# Patient Record
Sex: MALE | Race: OTHER | HISPANIC OR LATINO | URBAN - METROPOLITAN AREA
[De-identification: names, ages, dates, MRNs, and addresses within clinical notes are randomized per-mention and may not be internally consistent; named-entity substitution may affect disease eponyms.]

---

## 2019-12-04 ENCOUNTER — INPATIENT (INPATIENT)
Facility: HOSPITAL | Age: 24
LOS: 2 days | Discharge: HOME | DRG: 438 | End: 2019-12-07
Attending: INTERNAL MEDICINE | Admitting: INTERNAL MEDICINE
Payer: SELF-PAY

## 2019-12-04 VITALS
TEMPERATURE: 98 F | DIASTOLIC BLOOD PRESSURE: 97 MMHG | HEART RATE: 84 BPM | OXYGEN SATURATION: 98 % | SYSTOLIC BLOOD PRESSURE: 146 MMHG | RESPIRATION RATE: 20 BRPM

## 2019-12-04 DIAGNOSIS — E13.10 OTHER SPECIFIED DIABETES MELLITUS WITH KETOACIDOSIS WITHOUT COMA: ICD-10-CM

## 2019-12-04 LAB
ACETONE SERPL-MCNC: ABNORMAL
ALBUMIN SERPL ELPH-MCNC: 4.7 G/DL — SIGNIFICANT CHANGE UP (ref 3.3–5.2)
ALP SERPL-CCNC: 204 U/L — HIGH (ref 40–120)
ALT FLD-CCNC: 55 U/L — HIGH
AMPHET UR-MCNC: NEGATIVE — SIGNIFICANT CHANGE UP
AMYLASE P1 CFR SERPL: 265 U/L — HIGH (ref 36–128)
ANION GAP SERPL CALC-SCNC: 26 MMOL/L — HIGH (ref 5–17)
ANION GAP SERPL CALC-SCNC: 28 MMOL/L — HIGH (ref 5–17)
ANISOCYTOSIS BLD QL: SLIGHT — SIGNIFICANT CHANGE UP
APPEARANCE UR: CLEAR — SIGNIFICANT CHANGE UP
AST SERPL-CCNC: 55 U/L — HIGH
BACTERIA # UR AUTO: NEGATIVE — SIGNIFICANT CHANGE UP
BARBITURATES UR SCN-MCNC: NEGATIVE — SIGNIFICANT CHANGE UP
BASOPHILS # BLD AUTO: 0 K/UL — SIGNIFICANT CHANGE UP (ref 0–0.2)
BASOPHILS NFR BLD AUTO: 0 % — SIGNIFICANT CHANGE UP (ref 0–2)
BENZODIAZ UR-MCNC: NEGATIVE — SIGNIFICANT CHANGE UP
BILIRUB SERPL-MCNC: 0.3 MG/DL — LOW (ref 0.4–2)
BILIRUB UR-MCNC: NEGATIVE — SIGNIFICANT CHANGE UP
BUN SERPL-MCNC: 7 MG/DL — LOW (ref 8–20)
BUN SERPL-MCNC: 8 MG/DL — SIGNIFICANT CHANGE UP (ref 8–20)
CALCIUM SERPL-MCNC: 8.8 MG/DL — SIGNIFICANT CHANGE UP (ref 8.6–10.2)
CALCIUM SERPL-MCNC: 8.9 MG/DL — SIGNIFICANT CHANGE UP (ref 8.6–10.2)
CHLORIDE SERPL-SCNC: 101 MMOL/L — SIGNIFICANT CHANGE UP (ref 98–107)
CHLORIDE SERPL-SCNC: 105 MMOL/L — SIGNIFICANT CHANGE UP (ref 98–107)
CO2 SERPL-SCNC: 10 MMOL/L — CRITICAL LOW (ref 22–29)
CO2 SERPL-SCNC: 8 MMOL/L — CRITICAL LOW (ref 22–29)
COCAINE METAB.OTHER UR-MCNC: NEGATIVE — SIGNIFICANT CHANGE UP
COLOR SPEC: YELLOW — SIGNIFICANT CHANGE UP
CREAT SERPL-MCNC: 0.5 MG/DL — SIGNIFICANT CHANGE UP (ref 0.5–1.3)
CREAT SERPL-MCNC: 0.6 MG/DL — SIGNIFICANT CHANGE UP (ref 0.5–1.3)
DIFF PNL FLD: ABNORMAL
ELLIPTOCYTES BLD QL SMEAR: SLIGHT — SIGNIFICANT CHANGE UP
EOSINOPHIL # BLD AUTO: 0 K/UL — SIGNIFICANT CHANGE UP (ref 0–0.5)
EOSINOPHIL NFR BLD AUTO: 0 % — SIGNIFICANT CHANGE UP (ref 0–6)
EPI CELLS # UR: NEGATIVE — SIGNIFICANT CHANGE UP
GAS PNL BLDA: SIGNIFICANT CHANGE UP
GLUCOSE BLDC GLUCOMTR-MCNC: 236 MG/DL — HIGH (ref 70–99)
GLUCOSE BLDC GLUCOMTR-MCNC: 240 MG/DL — HIGH (ref 70–99)
GLUCOSE BLDC GLUCOMTR-MCNC: 241 MG/DL — HIGH (ref 70–99)
GLUCOSE BLDC GLUCOMTR-MCNC: 241 MG/DL — HIGH (ref 70–99)
GLUCOSE BLDC GLUCOMTR-MCNC: 258 MG/DL — HIGH (ref 70–99)
GLUCOSE BLDC GLUCOMTR-MCNC: 290 MG/DL — HIGH (ref 70–99)
GLUCOSE SERPL-MCNC: 245 MG/DL — HIGH (ref 70–115)
GLUCOSE SERPL-MCNC: 275 MG/DL — HIGH (ref 70–115)
GLUCOSE UR QL: 1000 MG/DL
HBA1C BLD-MCNC: 11.8 % — HIGH (ref 4–5.6)
HCT VFR BLD CALC: 47.2 % — SIGNIFICANT CHANGE UP (ref 39–50)
HGB BLD-MCNC: 16.7 G/DL — SIGNIFICANT CHANGE UP (ref 13–17)
KETONES UR-MCNC: ABNORMAL
LACTATE BLDV-MCNC: 1.3 MMOL/L — SIGNIFICANT CHANGE UP (ref 0.5–2)
LEUKOCYTE ESTERASE UR-ACNC: NEGATIVE — SIGNIFICANT CHANGE UP
LIDOCAIN IGE QN: 530 U/L — HIGH (ref 22–51)
LYMPHOCYTES # BLD AUTO: 1.53 K/UL — SIGNIFICANT CHANGE UP (ref 1–3.3)
LYMPHOCYTES # BLD AUTO: 16.5 % — SIGNIFICANT CHANGE UP (ref 13–44)
MACROCYTES BLD QL: SLIGHT — SIGNIFICANT CHANGE UP
MAGNESIUM SERPL-MCNC: 2.2 MG/DL — SIGNIFICANT CHANGE UP (ref 1.6–2.6)
MANUAL SMEAR VERIFICATION: SIGNIFICANT CHANGE UP
MCHC RBC-ENTMCNC: 32.5 PG — SIGNIFICANT CHANGE UP (ref 27–34)
MCHC RBC-ENTMCNC: 35.4 GM/DL — SIGNIFICANT CHANGE UP (ref 32–36)
MCV RBC AUTO: 91.8 FL — SIGNIFICANT CHANGE UP (ref 80–100)
METHADONE UR-MCNC: NEGATIVE — SIGNIFICANT CHANGE UP
MICROCYTES BLD QL: SLIGHT — SIGNIFICANT CHANGE UP
MONOCYTES # BLD AUTO: 0.08 K/UL — SIGNIFICANT CHANGE UP (ref 0–0.9)
MONOCYTES NFR BLD AUTO: 0.9 % — LOW (ref 2–14)
NEUTROPHILS # BLD AUTO: 7.67 K/UL — HIGH (ref 1.8–7.4)
NEUTROPHILS NFR BLD AUTO: 81.7 % — HIGH (ref 43–77)
NEUTS BAND # BLD: 0.9 % — SIGNIFICANT CHANGE UP (ref 0–8)
NITRITE UR-MCNC: NEGATIVE — SIGNIFICANT CHANGE UP
OPIATES UR-MCNC: POSITIVE
OSMOLALITY SERPL: 324 MOSMOL/KG — HIGH (ref 275–300)
OVALOCYTES BLD QL SMEAR: SLIGHT — SIGNIFICANT CHANGE UP
PCP SPEC-MCNC: SIGNIFICANT CHANGE UP
PCP UR-MCNC: NEGATIVE — SIGNIFICANT CHANGE UP
PH UR: 5 — SIGNIFICANT CHANGE UP (ref 5–8)
PHOSPHATE SERPL-MCNC: 2.4 MG/DL — SIGNIFICANT CHANGE UP (ref 2.4–4.7)
PLAT MORPH BLD: NORMAL — SIGNIFICANT CHANGE UP
PLATELET # BLD AUTO: 233 K/UL — SIGNIFICANT CHANGE UP (ref 150–400)
POIKILOCYTOSIS BLD QL AUTO: SLIGHT — SIGNIFICANT CHANGE UP
POLYCHROMASIA BLD QL SMEAR: SLIGHT — SIGNIFICANT CHANGE UP
POTASSIUM SERPL-MCNC: 4.3 MMOL/L — SIGNIFICANT CHANGE UP (ref 3.5–5.3)
POTASSIUM SERPL-MCNC: 5.2 MMOL/L — SIGNIFICANT CHANGE UP (ref 3.5–5.3)
POTASSIUM SERPL-SCNC: 4.3 MMOL/L — SIGNIFICANT CHANGE UP (ref 3.5–5.3)
POTASSIUM SERPL-SCNC: 5.2 MMOL/L — SIGNIFICANT CHANGE UP (ref 3.5–5.3)
PROT SERPL-MCNC: 9.1 G/DL — HIGH (ref 6.6–8.7)
PROT UR-MCNC: 30 MG/DL
RBC # BLD: 5.14 M/UL — SIGNIFICANT CHANGE UP (ref 4.2–5.8)
RBC # FLD: 15.4 % — HIGH (ref 10.3–14.5)
RBC BLD AUTO: ABNORMAL
RBC CASTS # UR COMP ASSIST: SIGNIFICANT CHANGE UP /HPF (ref 0–4)
SCHISTOCYTES BLD QL AUTO: SLIGHT — SIGNIFICANT CHANGE UP
SMUDGE CELLS # BLD: PRESENT — SIGNIFICANT CHANGE UP
SODIUM SERPL-SCNC: 137 MMOL/L — SIGNIFICANT CHANGE UP (ref 135–145)
SODIUM SERPL-SCNC: 141 MMOL/L — SIGNIFICANT CHANGE UP (ref 135–145)
SP GR SPEC: 1.02 — SIGNIFICANT CHANGE UP (ref 1.01–1.02)
THC UR QL: NEGATIVE — SIGNIFICANT CHANGE UP
TRIGL SERPL-MCNC: 1499 MG/DL — HIGH (ref 10–200)
TROPONIN T SERPL-MCNC: <0.01 NG/ML — SIGNIFICANT CHANGE UP (ref 0–0.06)
UROBILINOGEN FLD QL: NEGATIVE MG/DL — SIGNIFICANT CHANGE UP
WBC # BLD: 9.29 K/UL — SIGNIFICANT CHANGE UP (ref 3.8–10.5)
WBC # FLD AUTO: 9.29 K/UL — SIGNIFICANT CHANGE UP (ref 3.8–10.5)
WBC UR QL: SIGNIFICANT CHANGE UP

## 2019-12-04 PROCEDURE — 99291 CRITICAL CARE FIRST HOUR: CPT

## 2019-12-04 PROCEDURE — 74177 CT ABD & PELVIS W/CONTRAST: CPT | Mod: 26

## 2019-12-04 PROCEDURE — 71045 X-RAY EXAM CHEST 1 VIEW: CPT | Mod: 26

## 2019-12-04 PROCEDURE — 93010 ELECTROCARDIOGRAM REPORT: CPT

## 2019-12-04 PROCEDURE — 76705 ECHO EXAM OF ABDOMEN: CPT | Mod: 26

## 2019-12-04 RX ORDER — MORPHINE SULFATE 50 MG/1
4 CAPSULE, EXTENDED RELEASE ORAL ONCE
Refills: 0 | Status: DISCONTINUED | OUTPATIENT
Start: 2019-12-04 | End: 2019-12-04

## 2019-12-04 RX ORDER — SODIUM CHLORIDE 9 MG/ML
1000 INJECTION, SOLUTION INTRAVENOUS
Refills: 0 | Status: DISCONTINUED | OUTPATIENT
Start: 2019-12-04 | End: 2019-12-05

## 2019-12-04 RX ORDER — DEXTROSE 50 % IN WATER 50 %
25 SYRINGE (ML) INTRAVENOUS ONCE
Refills: 0 | Status: DISCONTINUED | OUTPATIENT
Start: 2019-12-04 | End: 2019-12-07

## 2019-12-04 RX ORDER — SODIUM CHLORIDE 9 MG/ML
1000 INJECTION INTRAMUSCULAR; INTRAVENOUS; SUBCUTANEOUS ONCE
Refills: 0 | Status: COMPLETED | OUTPATIENT
Start: 2019-12-04 | End: 2019-12-04

## 2019-12-04 RX ORDER — KETOROLAC TROMETHAMINE 30 MG/ML
15 SYRINGE (ML) INJECTION ONCE
Refills: 0 | Status: DISCONTINUED | OUTPATIENT
Start: 2019-12-04 | End: 2019-12-04

## 2019-12-04 RX ORDER — SODIUM CHLORIDE 9 MG/ML
2000 INJECTION, SOLUTION INTRAVENOUS ONCE
Refills: 0 | Status: COMPLETED | OUTPATIENT
Start: 2019-12-04 | End: 2019-12-04

## 2019-12-04 RX ORDER — INFLUENZA VIRUS VACCINE 15; 15; 15; 15 UG/.5ML; UG/.5ML; UG/.5ML; UG/.5ML
0.5 SUSPENSION INTRAMUSCULAR ONCE
Refills: 0 | Status: DISCONTINUED | OUTPATIENT
Start: 2019-12-04 | End: 2019-12-07

## 2019-12-04 RX ORDER — SODIUM BICARBONATE 1 MEQ/ML
50 SYRINGE (ML) INTRAVENOUS ONCE
Refills: 0 | Status: COMPLETED | OUTPATIENT
Start: 2019-12-04 | End: 2019-12-04

## 2019-12-04 RX ORDER — ONDANSETRON 8 MG/1
4 TABLET, FILM COATED ORAL ONCE
Refills: 0 | Status: COMPLETED | OUTPATIENT
Start: 2019-12-04 | End: 2019-12-04

## 2019-12-04 RX ORDER — FAMOTIDINE 10 MG/ML
20 INJECTION INTRAVENOUS ONCE
Refills: 0 | Status: COMPLETED | OUTPATIENT
Start: 2019-12-04 | End: 2019-12-04

## 2019-12-04 RX ORDER — SODIUM CHLORIDE 9 MG/ML
1000 INJECTION, SOLUTION INTRAVENOUS
Refills: 0 | Status: DISCONTINUED | OUTPATIENT
Start: 2019-12-04 | End: 2019-12-04

## 2019-12-04 RX ORDER — CHLORHEXIDINE GLUCONATE 213 G/1000ML
1 SOLUTION TOPICAL
Refills: 0 | Status: DISCONTINUED | OUTPATIENT
Start: 2019-12-04 | End: 2019-12-07

## 2019-12-04 RX ORDER — INSULIN HUMAN 100 [IU]/ML
3 INJECTION, SOLUTION SUBCUTANEOUS
Qty: 50 | Refills: 0 | Status: DISCONTINUED | OUTPATIENT
Start: 2019-12-04 | End: 2019-12-05

## 2019-12-04 RX ORDER — SODIUM BICARBONATE 1 MEQ/ML
0.24 SYRINGE (ML) INTRAVENOUS
Qty: 150 | Refills: 0 | Status: DISCONTINUED | OUTPATIENT
Start: 2019-12-04 | End: 2019-12-04

## 2019-12-04 RX ORDER — DEXTROSE 50 % IN WATER 50 %
12.5 SYRINGE (ML) INTRAVENOUS ONCE
Refills: 0 | Status: DISCONTINUED | OUTPATIENT
Start: 2019-12-04 | End: 2019-12-07

## 2019-12-04 RX ADMIN — SODIUM CHLORIDE 1000 MILLILITER(S): 9 INJECTION INTRAMUSCULAR; INTRAVENOUS; SUBCUTANEOUS at 17:23

## 2019-12-04 RX ADMIN — MORPHINE SULFATE 4 MILLIGRAM(S): 50 CAPSULE, EXTENDED RELEASE ORAL at 15:59

## 2019-12-04 RX ADMIN — Medication 150 MEQ/KG/HR: at 18:03

## 2019-12-04 RX ADMIN — SODIUM CHLORIDE 1000 MILLILITER(S): 9 INJECTION INTRAMUSCULAR; INTRAVENOUS; SUBCUTANEOUS at 16:00

## 2019-12-04 RX ADMIN — SODIUM CHLORIDE 1000 MILLILITER(S): 9 INJECTION INTRAMUSCULAR; INTRAVENOUS; SUBCUTANEOUS at 09:02

## 2019-12-04 RX ADMIN — Medication 50 MILLIEQUIVALENT(S): at 18:06

## 2019-12-04 RX ADMIN — Medication 15 MILLIGRAM(S): at 17:23

## 2019-12-04 RX ADMIN — SODIUM CHLORIDE 150 MILLILITER(S): 9 INJECTION, SOLUTION INTRAVENOUS at 20:10

## 2019-12-04 RX ADMIN — Medication 2 MILLIGRAM(S): at 21:04

## 2019-12-04 RX ADMIN — INSULIN HUMAN 3 UNIT(S)/HR: 100 INJECTION, SOLUTION SUBCUTANEOUS at 18:04

## 2019-12-04 RX ADMIN — Medication 15 MILLIGRAM(S): at 10:51

## 2019-12-04 RX ADMIN — FAMOTIDINE 20 MILLIGRAM(S): 10 INJECTION INTRAVENOUS at 09:02

## 2019-12-04 RX ADMIN — SODIUM CHLORIDE 1000 MILLILITER(S): 9 INJECTION INTRAMUSCULAR; INTRAVENOUS; SUBCUTANEOUS at 11:52

## 2019-12-04 RX ADMIN — ONDANSETRON 4 MILLIGRAM(S): 8 TABLET, FILM COATED ORAL at 09:02

## 2019-12-04 RX ADMIN — SODIUM CHLORIDE 2000 MILLILITER(S): 9 INJECTION, SOLUTION INTRAVENOUS at 22:59

## 2019-12-04 NOTE — H&P ADULT - ASSESSMENT
Acute pancreatitis  High AGMA    Neuro: No active issues  Cardiovascular: Aim for MAP target 65  Resp/Chest: Maintain SpO2 > 92%  GI/Nutrition: No gallstones on US. Will keep NPO for now  ID: No indication for Abx at this time. Will reassess needs  Renal: Start bicarb gtt @ 150cc/hr, BMP q8h. Check ASA, acetaminophen, SOsm and alcohol levels. U toxicology as well. Avoid nephrotoxic agents. Strict I&O  Endocrinology: Check A1c and lipid panel/ TGs. Maintain Blood sugar < 180. ISS as per protocol  Haem/Oncology:  DVT ppx w/ HSQ    Critical Care time: 35 minutes assessing presenting problems of acute illness that poses high probability of life threatening deterioration or end organ damage/dysfunction.  Medical decision making including Initiating plan of care, reviewing data, reviewing radiology, discussing with multidisciplinary team, non inclusive of procedures

## 2019-12-04 NOTE — H&P ADULT - NSHPREVIEWOFSYSTEMS_GEN_ALL_CORE
CONSTITUTIONAL: No fever, chills, or fatigue  EYES: No eye pain, visual disturbances, or discharge  ENMT:  No difficulty hearing, tinnitus, vertigo; No sinus or throat pain  NECK: No pain or stiffness  RESPIRATORY: No cough, wheezing, chills or hemoptysis; No shortness of breath  CARDIOVASCULAR: No chest pain, palpitations, dizziness, or leg swelling  GASTROINTESTINAL: +ve abdominal or epigastric pain. No nausea, vomiting, or hematemesis; No diarrhea or constipation. No melena or hematochezia.  GENITOURINARY: No dysuria, frequency, hematuria, or incontinence  NEUROLOGICAL: No headaches, memory loss, loss of strength, numbness, or tremors  SKIN: No itching, burning, rashes, or lesions   MUSCULOSKELETAL: No joint pain or swelling; No muscle, back, or extremity pain  PSYCHIATRIC: No depression, anxiety, mood swings, or difficulty sleeping

## 2019-12-04 NOTE — ED PROVIDER NOTE - PROGRESS NOTE DETAILS
PT evaluated by intake physician. HPI/PE/ROS as noted above. Will follow up plan per intake physician. pt reevaluated, pt in a lot of pain, will order pain meds and reassesses ICU consult called, PA to see Pt.

## 2019-12-04 NOTE — ED PROVIDER NOTE - NS ED ROS FT
Constitutional: (-) fever  (-)chills  (-)sweats  Eyes/ENT: (-) blurry vision, (-) epistaxis  (-)rhinorrhea   (-) sore throat    Cardiovascular: (-) chest pain, (-) palpitations (-) edema   Respiratory: (-) cough, (-) shortness of breath   Gastrointestinal: (-)nausea  (-)vomiting, (-) diarrhea  (+) epigastric abdominal pain   :  (-)dysuria, (-)frequency, (-)urgency, (-)hematuria  Musculoskeletal: (-) neck pain, (-) back pain, (-) joint pain  Integumentary: (-) rash, (-) edema  Neurological: (-) headache, (-) altered mental status  (-)LOC Constitutional: (-) fever  (-)chills  (-)sweats  Eyes/ENT: (-) blurry vision, (-) epistaxis  (-)rhinorrhea   (-) sore throat    Cardiovascular: (-) chest pain, (-) palpitations (-) edema   Respiratory: (-) cough, (-) shortness of breath   Gastrointestinal: (-)nausea  (-)vomiting, (-) diarrhea  (+) epigastric abdominal pain   :  (-)dysuria, (-)frequency, (-)urgency, (-)hematuria  Neurological: (-) headache,

## 2019-12-04 NOTE — H&P ADULT - HISTORY OF PRESENT ILLNESS
24M w/ no significant PMHx presented w/ abdominal pain of one day duration. He describe the pain was 10/10, non radiating and not associated w/ N/V/D/fever. No chest pain, LOC. Admits to binge drinking about 8-10 beers on Saturday. Denies any other substance abuse.   In ED he was HDS w/ elevated lipase and severe acidosis. CT abdo significant for acute pancreatitis 24M w/ no significant PMHx presented w/ abdominal pain of one day duration. He describes the pain as 10/10, non radiating and not associated w/ N/V/D/fever. No chest pain, LOC, trauma or falls. Admits to binge drinking about 8-10 beers on Saturday. Denies any other substance abuse.   In ED he was HDS w/ elevated lipase and severe acidosis. CT abdo significant for acute pancreatitis

## 2019-12-04 NOTE — ED PROVIDER NOTE - OBJECTIVE STATEMENT
23 y/o M pt with no PMHx presents to the ED c/o abdominal pain, beginning at 5am this morning. Patient was woken up from sleep from the pain. Pain is located in the epigastrium. Reports trying to make himself vomit to relieve the pain, with no success in vomiting, Denies nausea/vomiting/diarrhea. Reports having a turkey sandwich with [employer] yesterday. Drinking EtOH over the weekend. States he has not experienced this pain before.   SOC: social EtOH use  PMHx: n/a

## 2019-12-04 NOTE — H&P ADULT - NSHPPHYSICALEXAM_GEN_ALL_CORE
General: No acute distress.      HEENT: Pupils equal, reactive to light.  Symmetric.    PULM: Clear to auscultation bilaterally, no significant sputum production    NECK: Supple, no lymphadenopathy, trachea midline    CVS: Regular rate and rhythm, no murmurs, rubs, or gallops    ABD: Soft, nondistended, Epigastric tenderness, Sluggish bowel sounds    EXT: No edema, nontender    SKIN: Warm and well perfused, no rashes noted.    NEURO: Alert, oriented, interactive, nonfocal

## 2019-12-05 LAB
ANION GAP SERPL CALC-SCNC: 15 MMOL/L — SIGNIFICANT CHANGE UP (ref 5–17)
ANION GAP SERPL CALC-SCNC: 16 MMOL/L — SIGNIFICANT CHANGE UP (ref 5–17)
ANION GAP SERPL CALC-SCNC: 20 MMOL/L — HIGH (ref 5–17)
ANION GAP SERPL CALC-SCNC: 21 MMOL/L — HIGH (ref 5–17)
BUN SERPL-MCNC: 3 MG/DL — LOW (ref 8–20)
BUN SERPL-MCNC: <3 MG/DL — LOW (ref 8–20)
CALCIUM SERPL-MCNC: 8.5 MG/DL — LOW (ref 8.6–10.2)
CALCIUM SERPL-MCNC: 8.7 MG/DL — SIGNIFICANT CHANGE UP (ref 8.6–10.2)
CALCIUM SERPL-MCNC: 8.7 MG/DL — SIGNIFICANT CHANGE UP (ref 8.6–10.2)
CALCIUM SERPL-MCNC: 8.9 MG/DL — SIGNIFICANT CHANGE UP (ref 8.6–10.2)
CHLORIDE SERPL-SCNC: 106 MMOL/L — SIGNIFICANT CHANGE UP (ref 98–107)
CHLORIDE SERPL-SCNC: 107 MMOL/L — SIGNIFICANT CHANGE UP (ref 98–107)
CHLORIDE SERPL-SCNC: 109 MMOL/L — HIGH (ref 98–107)
CHLORIDE SERPL-SCNC: 111 MMOL/L — HIGH (ref 98–107)
CO2 SERPL-SCNC: 10 MMOL/L — CRITICAL LOW (ref 22–29)
CO2 SERPL-SCNC: 13 MMOL/L — LOW (ref 22–29)
CO2 SERPL-SCNC: 19 MMOL/L — LOW (ref 22–29)
CO2 SERPL-SCNC: 20 MMOL/L — LOW (ref 22–29)
CREAT SERPL-MCNC: 0.49 MG/DL — LOW (ref 0.5–1.3)
CREAT SERPL-MCNC: 0.54 MG/DL — SIGNIFICANT CHANGE UP (ref 0.5–1.3)
CREAT SERPL-MCNC: 0.54 MG/DL — SIGNIFICANT CHANGE UP (ref 0.5–1.3)
CREAT SERPL-MCNC: 0.56 MG/DL — SIGNIFICANT CHANGE UP (ref 0.5–1.3)
CULTURE RESULTS: SIGNIFICANT CHANGE UP
GLUCOSE BLDC GLUCOMTR-MCNC: 159 MG/DL — HIGH (ref 70–99)
GLUCOSE BLDC GLUCOMTR-MCNC: 169 MG/DL — HIGH (ref 70–99)
GLUCOSE BLDC GLUCOMTR-MCNC: 173 MG/DL — HIGH (ref 70–99)
GLUCOSE BLDC GLUCOMTR-MCNC: 176 MG/DL — HIGH (ref 70–99)
GLUCOSE BLDC GLUCOMTR-MCNC: 179 MG/DL — HIGH (ref 70–99)
GLUCOSE BLDC GLUCOMTR-MCNC: 180 MG/DL — HIGH (ref 70–99)
GLUCOSE BLDC GLUCOMTR-MCNC: 181 MG/DL — HIGH (ref 70–99)
GLUCOSE BLDC GLUCOMTR-MCNC: 183 MG/DL — HIGH (ref 70–99)
GLUCOSE BLDC GLUCOMTR-MCNC: 191 MG/DL — HIGH (ref 70–99)
GLUCOSE BLDC GLUCOMTR-MCNC: 194 MG/DL — HIGH (ref 70–99)
GLUCOSE BLDC GLUCOMTR-MCNC: 196 MG/DL — HIGH (ref 70–99)
GLUCOSE BLDC GLUCOMTR-MCNC: 197 MG/DL — HIGH (ref 70–99)
GLUCOSE BLDC GLUCOMTR-MCNC: 202 MG/DL — HIGH (ref 70–99)
GLUCOSE BLDC GLUCOMTR-MCNC: 202 MG/DL — HIGH (ref 70–99)
GLUCOSE BLDC GLUCOMTR-MCNC: 219 MG/DL — HIGH (ref 70–99)
GLUCOSE BLDC GLUCOMTR-MCNC: 231 MG/DL — HIGH (ref 70–99)
GLUCOSE BLDC GLUCOMTR-MCNC: 233 MG/DL — HIGH (ref 70–99)
GLUCOSE BLDC GLUCOMTR-MCNC: 272 MG/DL — HIGH (ref 70–99)
GLUCOSE SERPL-MCNC: 158 MG/DL — HIGH (ref 70–115)
GLUCOSE SERPL-MCNC: 179 MG/DL — HIGH (ref 70–115)
GLUCOSE SERPL-MCNC: 196 MG/DL — HIGH (ref 70–115)
GLUCOSE SERPL-MCNC: 214 MG/DL — HIGH (ref 70–115)
HCT VFR BLD CALC: 46.2 % — SIGNIFICANT CHANGE UP (ref 39–50)
HGB BLD-MCNC: 16 G/DL — SIGNIFICANT CHANGE UP (ref 13–17)
LIDOCAIN IGE QN: 474 U/L — HIGH (ref 22–51)
MAGNESIUM SERPL-MCNC: 1.9 MG/DL — SIGNIFICANT CHANGE UP (ref 1.6–2.6)
MAGNESIUM SERPL-MCNC: 2 MG/DL — SIGNIFICANT CHANGE UP (ref 1.6–2.6)
MAGNESIUM SERPL-MCNC: 2 MG/DL — SIGNIFICANT CHANGE UP (ref 1.8–2.6)
MAGNESIUM SERPL-MCNC: 2.1 MG/DL — SIGNIFICANT CHANGE UP (ref 1.6–2.6)
MCHC RBC-ENTMCNC: 31.3 PG — SIGNIFICANT CHANGE UP (ref 27–34)
MCHC RBC-ENTMCNC: 34.6 GM/DL — SIGNIFICANT CHANGE UP (ref 32–36)
MCV RBC AUTO: 90.2 FL — SIGNIFICANT CHANGE UP (ref 80–100)
PHOSPHATE SERPL-MCNC: 1.7 MG/DL — LOW (ref 2.4–4.7)
PHOSPHATE SERPL-MCNC: 1.8 MG/DL — LOW (ref 2.4–4.7)
PHOSPHATE SERPL-MCNC: 2.5 MG/DL — SIGNIFICANT CHANGE UP (ref 2.4–4.7)
PLATELET # BLD AUTO: 173 K/UL — SIGNIFICANT CHANGE UP (ref 150–400)
POTASSIUM SERPL-MCNC: 3.2 MMOL/L — LOW (ref 3.5–5.3)
POTASSIUM SERPL-MCNC: 3.6 MMOL/L — SIGNIFICANT CHANGE UP (ref 3.5–5.3)
POTASSIUM SERPL-MCNC: 3.6 MMOL/L — SIGNIFICANT CHANGE UP (ref 3.5–5.3)
POTASSIUM SERPL-MCNC: 4 MMOL/L — SIGNIFICANT CHANGE UP (ref 3.5–5.3)
POTASSIUM SERPL-SCNC: 3.2 MMOL/L — LOW (ref 3.5–5.3)
POTASSIUM SERPL-SCNC: 3.6 MMOL/L — SIGNIFICANT CHANGE UP (ref 3.5–5.3)
POTASSIUM SERPL-SCNC: 3.6 MMOL/L — SIGNIFICANT CHANGE UP (ref 3.5–5.3)
POTASSIUM SERPL-SCNC: 4 MMOL/L — SIGNIFICANT CHANGE UP (ref 3.5–5.3)
RBC # BLD: 5.12 M/UL — SIGNIFICANT CHANGE UP (ref 4.2–5.8)
RBC # FLD: 16.3 % — HIGH (ref 10.3–14.5)
SODIUM SERPL-SCNC: 141 MMOL/L — SIGNIFICANT CHANGE UP (ref 135–145)
SODIUM SERPL-SCNC: 142 MMOL/L — SIGNIFICANT CHANGE UP (ref 135–145)
SPECIMEN SOURCE: SIGNIFICANT CHANGE UP
TRIGL SERPL-MCNC: 482 MG/DL — HIGH (ref 10–200)
WBC # BLD: 7.67 K/UL — SIGNIFICANT CHANGE UP (ref 3.8–10.5)
WBC # FLD AUTO: 7.67 K/UL — SIGNIFICANT CHANGE UP (ref 3.8–10.5)

## 2019-12-05 PROCEDURE — 99222 1ST HOSP IP/OBS MODERATE 55: CPT

## 2019-12-05 PROCEDURE — 99291 CRITICAL CARE FIRST HOUR: CPT

## 2019-12-05 RX ORDER — POTASSIUM CHLORIDE 20 MEQ
40 PACKET (EA) ORAL EVERY 4 HOURS
Refills: 0 | Status: COMPLETED | OUTPATIENT
Start: 2019-12-05 | End: 2019-12-05

## 2019-12-05 RX ORDER — SODIUM CHLORIDE 9 MG/ML
2000 INJECTION, SOLUTION INTRAVENOUS ONCE
Refills: 0 | Status: COMPLETED | OUTPATIENT
Start: 2019-12-05 | End: 2019-12-05

## 2019-12-05 RX ORDER — MORPHINE SULFATE 50 MG/1
2 CAPSULE, EXTENDED RELEASE ORAL EVERY 6 HOURS
Refills: 0 | Status: DISCONTINUED | OUTPATIENT
Start: 2019-12-05 | End: 2019-12-07

## 2019-12-05 RX ORDER — SODIUM CHLORIDE 9 MG/ML
1000 INJECTION, SOLUTION INTRAVENOUS
Refills: 0 | Status: DISCONTINUED | OUTPATIENT
Start: 2019-12-05 | End: 2019-12-06

## 2019-12-05 RX ORDER — INSULIN GLARGINE 100 [IU]/ML
15 INJECTION, SOLUTION SUBCUTANEOUS AT BEDTIME
Refills: 0 | Status: DISCONTINUED | OUTPATIENT
Start: 2019-12-05 | End: 2019-12-07

## 2019-12-05 RX ORDER — THIAMINE MONONITRATE (VIT B1) 100 MG
100 TABLET ORAL DAILY
Refills: 0 | Status: DISCONTINUED | OUTPATIENT
Start: 2019-12-05 | End: 2019-12-07

## 2019-12-05 RX ORDER — INSULIN GLARGINE 100 [IU]/ML
20 INJECTION, SOLUTION SUBCUTANEOUS ONCE
Refills: 0 | Status: COMPLETED | OUTPATIENT
Start: 2019-12-05 | End: 2019-12-05

## 2019-12-05 RX ORDER — POTASSIUM PHOSPHATE, MONOBASIC POTASSIUM PHOSPHATE, DIBASIC 236; 224 MG/ML; MG/ML
30 INJECTION, SOLUTION INTRAVENOUS ONCE
Refills: 0 | Status: COMPLETED | OUTPATIENT
Start: 2019-12-05 | End: 2019-12-05

## 2019-12-05 RX ORDER — FENOFIBRATE,MICRONIZED 130 MG
145 CAPSULE ORAL DAILY
Refills: 0 | Status: DISCONTINUED | OUTPATIENT
Start: 2019-12-05 | End: 2019-12-07

## 2019-12-05 RX ORDER — ENOXAPARIN SODIUM 100 MG/ML
40 INJECTION SUBCUTANEOUS DAILY
Refills: 0 | Status: DISCONTINUED | OUTPATIENT
Start: 2019-12-05 | End: 2019-12-07

## 2019-12-05 RX ORDER — ACETAMINOPHEN 500 MG
650 TABLET ORAL ONCE
Refills: 0 | Status: COMPLETED | OUTPATIENT
Start: 2019-12-05 | End: 2019-12-05

## 2019-12-05 RX ORDER — MAGNESIUM SULFATE 500 MG/ML
2 VIAL (ML) INJECTION ONCE
Refills: 0 | Status: COMPLETED | OUTPATIENT
Start: 2019-12-05 | End: 2019-12-05

## 2019-12-05 RX ORDER — INSULIN LISPRO 100/ML
VIAL (ML) SUBCUTANEOUS
Refills: 0 | Status: DISCONTINUED | OUTPATIENT
Start: 2019-12-05 | End: 2019-12-07

## 2019-12-05 RX ADMIN — INSULIN GLARGINE 15 UNIT(S): 100 INJECTION, SOLUTION SUBCUTANEOUS at 22:03

## 2019-12-05 RX ADMIN — MORPHINE SULFATE 2 MILLIGRAM(S): 50 CAPSULE, EXTENDED RELEASE ORAL at 21:00

## 2019-12-05 RX ADMIN — POTASSIUM PHOSPHATE, MONOBASIC POTASSIUM PHOSPHATE, DIBASIC 83.33 MILLIMOLE(S): 236; 224 INJECTION, SOLUTION INTRAVENOUS at 13:03

## 2019-12-05 RX ADMIN — Medication 650 MILLIGRAM(S): at 03:58

## 2019-12-05 RX ADMIN — Medication 1 TABLET(S): at 22:03

## 2019-12-05 RX ADMIN — Medication 100 MILLIGRAM(S): at 22:03

## 2019-12-05 RX ADMIN — Medication 40 MILLIEQUIVALENT(S): at 23:33

## 2019-12-05 RX ADMIN — SODIUM CHLORIDE 4000 MILLILITER(S): 9 INJECTION, SOLUTION INTRAVENOUS at 04:50

## 2019-12-05 RX ADMIN — Medication 2: at 22:04

## 2019-12-05 RX ADMIN — ENOXAPARIN SODIUM 40 MILLIGRAM(S): 100 INJECTION SUBCUTANEOUS at 13:03

## 2019-12-05 RX ADMIN — Medication 145 MILLIGRAM(S): at 22:03

## 2019-12-05 RX ADMIN — Medication 40 MILLIEQUIVALENT(S): at 20:29

## 2019-12-05 RX ADMIN — Medication 650 MILLIGRAM(S): at 02:58

## 2019-12-05 RX ADMIN — INSULIN GLARGINE 20 UNIT(S): 100 INJECTION, SOLUTION SUBCUTANEOUS at 16:20

## 2019-12-05 RX ADMIN — Medication 50 GRAM(S): at 20:08

## 2019-12-05 RX ADMIN — MORPHINE SULFATE 2 MILLIGRAM(S): 50 CAPSULE, EXTENDED RELEASE ORAL at 20:29

## 2019-12-05 RX ADMIN — POTASSIUM PHOSPHATE, MONOBASIC POTASSIUM PHOSPHATE, DIBASIC 83.33 MILLIMOLE(S): 236; 224 INJECTION, SOLUTION INTRAVENOUS at 05:59

## 2019-12-05 NOTE — PROVIDER CONTACT NOTE (EICU) - SITUATION
Called by RN for fingerstick of 194 on insulin infusion w/ 6units/hr. Instructed RN to continue w/ D5+ LR @ 150ml/hr. BMP, Mg, Phos to be repeated to trend AG. RN also reports fever of 100.3F as such ordered blood cultures and tylenol PO.

## 2019-12-05 NOTE — CONSULT NOTE ADULT - ASSESSMENT
DM type 2, with acute loss of control due to acute pancreatitis, probably precipitated by alcohol. A1C elevation indicates onset of diabetes prior to this hospitalization. Severe acanthosis and obesity suggests underlying type 2  diabetes.  Reasonable to treat with insulin for now, but suspect that he will eventually be treatable with oral agents. Cannot use a glp-1 due to pancreatitis.  Will follow.

## 2019-12-05 NOTE — PROGRESS NOTE ADULT - SUBJECTIVE AND OBJECTIVE BOX
25 y/o male with no PMH who came to the ED for abdominal pain admitted to MICU for acute pancreatitis and DKA.     HPI:  24M w/ no significant PMHx presented w/ abdominal pain of one day duration. He describes the pain as 10/10, non radiating and not associated w/ N/V/D/fever. No chest pain, LOC, trauma or falls. Admits to binge drinking about 8-10 beers on Saturday. Denies any other substance abuse.   In ED he was HDS w/ elevated lipase and severe acidosis. CT abdo significant for acute pancreatitis (04 Dec 2019 17:52)    Today, patient seen and examined at bed side, not in acute distress. Still complaining of epigastric pain 4/10 but noted significant improvement. Tolerating PO well, no nausea/vomiting.       PAST MEDICAL & SURGICAL HISTORY:  No pertinent past medical history  No significant past surgical history      MEDICATIONS  (STANDING):  chlorhexidine 4% Liquid 1 Application(s) Topical <User Schedule>  dextrose 50% Injectable 25 Gram(s) IV Push once  dextrose 50% Injectable 12.5 Gram(s) IV Push once  enoxaparin Injectable 40 milliGRAM(s) SubCutaneous daily  fenofibrate Tablet 145 milliGRAM(s) Oral daily  influenza   Vaccine 0.5 milliLiter(s) IntraMuscular once  insulin glargine Injectable (LANTUS) 15 Unit(s) SubCutaneous at bedtime  insulin lispro (HumaLOG) corrective regimen sliding scale   SubCutaneous Before meals and at bedtime  multiple electrolytes Injection Type 1 1000 milliLiter(s) (150 mL/Hr) IV Continuous <Continuous>  multivitamin 1 Tablet(s) Oral daily  potassium chloride    Tablet ER 40 milliEquivalent(s) Oral every 4 hours  thiamine 100 milliGRAM(s) Oral daily    MEDICATIONS  (PRN):  morphine  - Injectable 2 milliGRAM(s) IV Push every 6 hours PRN Severe Pain (7 - 10)    Allergies    No Known Allergies      SOCIAL HISTORY: no cigarette or illicit drug use. Drinks beer socially; last drink Saturday (8 cans of beer)     FAMILY HISTORY:  No pertinent family history in first degree relatives      Vital Signs Last 24 Hrs  T(C): 37.6 (05 Dec 2019 20:00), Max: 38 (05 Dec 2019 16:00)  T(F): 99.7 (05 Dec 2019 20:00), Max: 100.4 (05 Dec 2019 16:00)  HR: 114 (05 Dec 2019 21:00) (113 - 137)  BP: 148/85 (05 Dec 2019 21:00) (128/78 - 171/88)  BP(mean): 110 (05 Dec 2019 21:00) (96 - 122)  RR: 40 (05 Dec 2019 21:00) (20 - 44)  SpO2: 97% (05 Dec 2019 21:00) (95% - 99%)    PHYSICAL EXAM:      Constitutional: obesity, well groomed, not in acute distress     Eyes: PERRLA     ENMT: mouth moist     Respiratory: CTA b/l     Cardiovascular: s1, s2 RRR    Gastrointestinal: epigastric tenderness otherwise soft, BS+    Extremities: no     Vascular:    Neurological:    Skin:    Lymph Nodes:    Musculoskeletal:    Psychiatric:        LABS:                        16.0   7.67  )-----------( 173      ( 05 Dec 2019 06:50 )             46.2     12-    142  |  106  |  <3.0<L>  ----------------------------<  196<H>  3.2<L>   |  20.0<L>  |  0.49<L>    Ca    8.9      05 Dec 2019 18:08  Phos  2.5     12-05  Mg     1.9     12-05    TPro  9.1<H>  /  Alb  4.7  /  TBili  0.3<L>  /  DBili  x   /  AST  55<H>  /  ALT  55<H>  /  AlkPhos  204<H>  12-04      Urinalysis Basic - ( 04 Dec 2019 13:38 )    Color: Yellow / Appearance: Clear / S.025 / pH: x  Gluc: x / Ketone: Large  / Bili: Negative / Urobili: Negative mg/dL   Blood: x / Protein: 30 mg/dL / Nitrite: Negative   Leuk Esterase: Negative / RBC: 0-2 /HPF / WBC 0-2   Sq Epi: x / Non Sq Epi: Negative / Bacteria: Negative        RADIOLOGY & ADDITIONAL STUDIES: 25 y/o male with no PMH who came to the ED for abdominal pain admitted to MICU for acute pancreatitis and DKA.     HPI:  24M w/ no significant PMHx presented w/ abdominal pain of one day duration. He describes the pain as 10/10, non radiating and not associated w/ N/V/D/fever. No chest pain, LOC, trauma or falls. Admits to binge drinking about 8-10 beers on Saturday. Denies any other substance abuse.   In ED he was HDS w/ elevated lipase and severe acidosis. CT abdo significant for acute pancreatitis (04 Dec 2019 17:52)    Today, patient seen and examined at bed side, not in acute distress. Still complaining of epigastric pain 4/10 but noted significant improvement. Tolerating PO well, no nausea/vomiting.       PAST MEDICAL & SURGICAL HISTORY:  No pertinent past medical history  No significant past surgical history      MEDICATIONS  (STANDING):  chlorhexidine 4% Liquid 1 Application(s) Topical <User Schedule>  dextrose 50% Injectable 25 Gram(s) IV Push once  dextrose 50% Injectable 12.5 Gram(s) IV Push once  enoxaparin Injectable 40 milliGRAM(s) SubCutaneous daily  fenofibrate Tablet 145 milliGRAM(s) Oral daily  influenza   Vaccine 0.5 milliLiter(s) IntraMuscular once  insulin glargine Injectable (LANTUS) 15 Unit(s) SubCutaneous at bedtime  insulin lispro (HumaLOG) corrective regimen sliding scale   SubCutaneous Before meals and at bedtime  multiple electrolytes Injection Type 1 1000 milliLiter(s) (150 mL/Hr) IV Continuous <Continuous>  multivitamin 1 Tablet(s) Oral daily  potassium chloride    Tablet ER 40 milliEquivalent(s) Oral every 4 hours  thiamine 100 milliGRAM(s) Oral daily    MEDICATIONS  (PRN):  morphine  - Injectable 2 milliGRAM(s) IV Push every 6 hours PRN Severe Pain (7 - 10)    Allergies    No Known Allergies      SOCIAL HISTORY: no cigarette or illicit drug use. Drinks beer socially; last drink Saturday (8 cans of beer)     FAMILY HISTORY:  No pertinent family history in first degree relatives      Vital Signs Last 24 Hrs  T(C): 37.6 (05 Dec 2019 20:00), Max: 38 (05 Dec 2019 16:00)  T(F): 99.7 (05 Dec 2019 20:00), Max: 100.4 (05 Dec 2019 16:00)  HR: 114 (05 Dec 2019 21:00) (113 - 137)  BP: 148/85 (05 Dec 2019 21:00) (128/78 - 171/88)  BP(mean): 110 (05 Dec 2019 21:00) (96 - 122)  RR: 40 (05 Dec 2019 21:00) (20 - 44)  SpO2: 97% (05 Dec 2019 21:00) (95% - 99%)    PHYSICAL EXAM:      Constitutional: obesity, well groomed, not in acute distress     Eyes: PERRLA     ENMT: mouth moist     Respiratory: CTA b/l     Cardiovascular: s1, s2 RRR    Gastrointestinal: epigastric tenderness otherwise soft, BS+    Extremities: no edema     Neurological: awake, alert and oriented. Follows command     Skin: warm, normal color     Musculoskeletal: ROM intact, no joint swelling, no calf tenderness     Psychiatric: normal mood and affect       LABS:                        16.0   7.67  )-----------( 173      ( 05 Dec 2019 06:50 )             46.2     12-    142  |  106  |  <3.0<L>  ----------------------------<  196<H>  3.2<L>   |  20.0<L>  |  0.49<L>    Ca    8.9      05 Dec 2019 18:08  Phos  2.5     12-05  Mg     1.9     12-05    TPro  9.1<H>  /  Alb  4.7  /  TBili  0.3<L>  /  DBili  x   /  AST  55<H>  /  ALT  55<H>  /  AlkPhos  204<H>  12-04      Urinalysis Basic - ( 04 Dec 2019 13:38 )    Color: Yellow / Appearance: Clear / S.025 / pH: x  Gluc: x / Ketone: Large  / Bili: Negative / Urobili: Negative mg/dL   Blood: x / Protein: 30 mg/dL / Nitrite: Negative   Leuk Esterase: Negative / RBC: 0-2 /HPF / WBC 0-2   Sq Epi: x / Non Sq Epi: Negative / Bacteria: Negative

## 2019-12-05 NOTE — PROGRESS NOTE ADULT - SUBJECTIVE AND OBJECTIVE BOX
Patient is a 24y old  Male who presents with a chief complaint of Abdominal pain (04 Dec 2019 17:52)      BRIEF HOSPITAL COURSE:   24M w/ no significant PMHx presented w/ abdominal pain of one day duration. He describes the pain as 10/10, non radiating and not associated w/ N/V/D/fever. No chest pain, LOC, trauma or falls. Admits to binge drinking about 8-10 beers on Saturday. Denies any other substance abuse.   In ED he was HDS w/ elevated lipase and severe acidosis. CT abdo significant for acute pancreatitis      Events last 24 hours:   Minimal upper abd pain    PAST MEDICAL & SURGICAL HISTORY:  No pertinent past medical history  No significant past surgical history      All systems reviewed with symptoms mentions as above.     Physical Examination:    ICU Vital Signs Last 24 Hrs  T(C): 36.8 (05 Dec 2019 04:00), Max: 38.1 (04 Dec 2019 18:12)  T(F): 98.3 (05 Dec 2019 04:00), Max: 100.5 (04 Dec 2019 18:12)  HR: 113 (05 Dec 2019 12:00) (112 - 138)  BP: 149/88 (05 Dec 2019 12:00) (140/76 - 171/88)  BP(mean): 113 (05 Dec 2019 12:00) (99 - 122)  ABP: --  ABP(mean): --  RR: 26 (05 Dec 2019 12:00) (20 - 44)  SpO2: 99% (05 Dec 2019 12:00) (97% - 100%)      General: No acute distress.      Neuro: AAO*3, No motor, sensory, or cranial nerve deficit    HEENT: Pupils equal, reactive to light, Oral mucosa    PULM: Clear to auscultation bilaterally, no significant adventitious breath sounds     CVS: Regular rhythm and controlled rate, no murmurs, rubs, or gallops    ABD: Soft, nondistended,  epigastric tenderness+, normoactive bowel sounds, no CVA tenderness    EXT: No b/l LE edema, nontender with pedal pulse palpable     SKIN: Warm and well perfused, no acute rashes       Medications:    enoxaparin Injectable 40 milliGRAM(s) SubCutaneous daily  dextrose 50% Injectable 25 Gram(s) IV Push once  dextrose 50% Injectable 12.5 Gram(s) IV Push once  insulin glargine Injectable (LANTUS) 20 Unit(s) SubCutaneous once  insulin glargine Injectable (LANTUS) 15 Unit(s) SubCutaneous at bedtime  insulin regular Infusion 3 Unit(s)/Hr IV Continuous <Continuous>  dextrose 5% + lactated ringers. 1000 milliLiter(s) IV Continuous <Continuous>  potassium phosphate IVPB 30 milliMole(s) IV Intermittent once  influenza   Vaccine 0.5 milliLiter(s) IntraMuscular once  chlorhexidine 4% Liquid 1 Application(s) Topical <User Schedule>      I&O's Detail    04 Dec 2019 07:01  -  05 Dec 2019 07:00  --------------------------------------------------------  IN:    dextrose 5% + lactated ringers.: 1500 mL    insulin regular Infusion: 83 mL    Lactated Ringers IV Bolus: 2000 mL    multiple electrolytes Injection Type 1 Bolus: 2000 mL  Total IN: 5583 mL    OUT:    Voided: 3500 mL  Total OUT: 3500 mL    Total NET: 2083 mL      05 Dec 2019 07:01  -  05 Dec 2019 14:21  --------------------------------------------------------  IN:  Total IN: 0 mL    OUT:    Voided: 800 mL  Total OUT: 800 mL    Total NET: -800 mL      RADIOLOGY/ Microbiology/ Labs: reviewed     CRITICAL CARE TIME SPENT: 45 min

## 2019-12-05 NOTE — CONSULT NOTE ADULT - SUBJECTIVE AND OBJECTIVE BOX
HPI:  24M w/ no significant PMHx presented w/ abdominal pain of one day duration. He describes the pain as 10/10, non radiating and not associated w/ N/V/D/fever. No chest pain, LOC, trauma or falls. Admits to binge drinking about 8-10 beers on Saturday. Denies any other substance abuse.   In ED he was HDS w/ elevated lipase and severe acidosis. CT abdo significant for acute pancreatitis (04 Dec 2019 17:52)  No prior history of diabetes, which was noted for the first time this admission      PAST MEDICAL & SURGICAL HISTORY:  No pertinent past medical history  No significant past surgical history      FAMILY HISTORY:  No pertinent family history in first degree relatives      SOCIAL HISTORY:    REVIEW OF SYSTEMS:  as noted in HPI      MEDICATIONS  (STANDING):  chlorhexidine 4% Liquid 1 Application(s) Topical <User Schedule>  dextrose 5% + lactated ringers. 1000 milliLiter(s) (150 mL/Hr) IV Continuous <Continuous>  dextrose 50% Injectable 25 Gram(s) IV Push once  dextrose 50% Injectable 12.5 Gram(s) IV Push once  enoxaparin Injectable 40 milliGRAM(s) SubCutaneous daily  influenza   Vaccine 0.5 milliLiter(s) IntraMuscular once  insulin glargine Injectable (LANTUS) 15 Unit(s) SubCutaneous at bedtime  insulin regular Infusion 3 Unit(s)/Hr (3 mL/Hr) IV Continuous <Continuous>  potassium phosphate IVPB 30 milliMole(s) IV Intermittent once    MEDICATIONS  (PRN):      Allergies    No Known Allergies    Intolerances          PHYSICAL EXAM:    Vital Signs Last 24 Hrs  T(C): 38 (05 Dec 2019 16:00), Max: 38.1 (04 Dec 2019 18:12)  T(F): 100.4 (05 Dec 2019 16:00), Max: 100.5 (04 Dec 2019 18:12)  HR: 117 (05 Dec 2019 15:00) (112 - 138)  BP: 143/81 (05 Dec 2019 15:00) (140/76 - 171/88)  BP(mean): 103 (05 Dec 2019 15:00) (99 - 122)  RR: 24 (05 Dec 2019 15:00) (20 - 44)  SpO2: 97% (05 Dec 2019 15:00) (95% - 100%)    General appearance: Well developed, well nourished. Generalized obesity    Eyes: Pupils equal. EOM full. No exophthalmos.    Neck: Trachea midline. No thyroid enlargement.    Lungs: Normal respiratory excursion. Lungs clear.    CV: Regular cardiac rhythm. No murmur.     Abdomen: Soft, mild epigastric tenderness    Musculoskeletal: No cyanosis, clubbing, or edema.     Skin: Warm and dry. Extensive acanthosis nigricans    Neuro: Cranial nerves intact. Normal motor and sensory function. DTR's normal.    Psych: Normal affect, good judgement.            LABS:                        16.0   7.67  )-----------( 173      ( 05 Dec 2019 06:50 )             46.2         141  |  107  |  3.0<L>  ----------------------------<  158<H>  3.6   |  19.0<L>  |  0.54    Ca    8.7      05 Dec 2019 12:34  Phos  2.5       Mg     2.0         TPro  9.1<H>  /  Alb  4.7  /  TBili  0.3<L>  /  DBili  x   /  AST  55<H>  /  ALT  55<H>  /  AlkPhos  204<H>      Urinalysis Basic - ( 04 Dec 2019 13:38 )    Color: Yellow / Appearance: Clear / S.025 / pH: x  Gluc: x / Ketone: Large  / Bili: Negative / Urobili: Negative mg/dL   Blood: x / Protein: 30 mg/dL / Nitrite: Negative   Leuk Esterase: Negative / RBC: 0-2 /HPF / WBC 0-2   Sq Epi: x / Non Sq Epi: Negative / Bacteria: Negative      LIVER FUNCTIONS - ( 04 Dec 2019 13:50 )  Alb: 4.7 g/dL / Pro: 9.1 g/dL / ALK PHOS: 204 U/L / ALT: 55 U/L / AST: 55 U/L / GGT: x           Hemoglobin A1C, Whole Blood: 11.8 % ( @ 17:57)        POCT Blood Glucose.: 233 mg/dL (19 @ 16:15)  POCT Blood Glucose.: 183 mg/dL (19 @ 15:29)  POCT Blood Glucose.: 159 mg/dL (19 @ 14:08)  POCT Blood Glucose.: 169 mg/dL (19 @ 11:52)  POCT Blood Glucose.: 181 mg/dL (19 @ 11:04)  POCT Blood Glucose.: 180 mg/dL (19 @ 09:05)  POCT Blood Glucose.: 176 mg/dL (19 @ 08:02)  POCT Blood Glucose.: 196 mg/dL (19 @ 07:00)  POCT Blood Glucose.: 202 mg/dL (19 @ 06:05)  POCT Blood Glucose.: 202 mg/dL (19 @ 05:06)  POCT Blood Glucose.: 272 mg/dL (19 @ 04:05)  POCT Blood Glucose.: 191 mg/dL (19 @ 03:04)  POCT Blood Glucose.: 194 mg/dL (19 @ 02:12)  POCT Blood Glucose.: 179 mg/dL (19 @ 01:00)  POCT Blood Glucose.: 219 mg/dL (19 @ 00:12)  POCT Blood Glucose.: 290 mg/dL (19 @ 23:05)  POCT Blood Glucose.: 240 mg/dL (19 @ 21:59)  POCT Blood Glucose.: 236 mg/dL (19 @ 21:01)  POCT Blood Glucose.: 241 mg/dL (19 @ 19:56)  POCT Blood Glucose.: 241 mg/dL (19 @ 18:41)  POCT Blood Glucose.: 258 mg/dL (19 @ 18:08)  POCT Blood Glucose.: 245 mg/dL (19 @ 16:39)

## 2019-12-05 NOTE — SBIRT NOTE ADULT - NSSBIRTBRIEFINTDET_GEN_A_CORE
discussed possibility of less consumption due to hospitalization and effects of etoh on health.  pt will do that.  doesn't want resources at this time.

## 2019-12-06 LAB
ALBUMIN SERPL ELPH-MCNC: 3.2 G/DL — LOW (ref 3.3–5.2)
ALP SERPL-CCNC: 112 U/L — SIGNIFICANT CHANGE UP (ref 40–120)
ALT FLD-CCNC: 21 U/L — SIGNIFICANT CHANGE UP
ANION GAP SERPL CALC-SCNC: 18 MMOL/L — HIGH (ref 5–17)
ANION GAP SERPL CALC-SCNC: 19 MMOL/L — HIGH (ref 5–17)
ANION GAP SERPL CALC-SCNC: 20 MMOL/L — HIGH (ref 5–17)
AST SERPL-CCNC: 16 U/L — SIGNIFICANT CHANGE UP
BILIRUB SERPL-MCNC: 0.7 MG/DL — SIGNIFICANT CHANGE UP (ref 0.4–2)
BUN SERPL-MCNC: 4 MG/DL — LOW (ref 8–20)
BUN SERPL-MCNC: 6 MG/DL — LOW (ref 8–20)
BUN SERPL-MCNC: 6 MG/DL — LOW (ref 8–20)
CALCIUM SERPL-MCNC: 8.6 MG/DL — SIGNIFICANT CHANGE UP (ref 8.6–10.2)
CALCIUM SERPL-MCNC: 8.7 MG/DL — SIGNIFICANT CHANGE UP (ref 8.6–10.2)
CALCIUM SERPL-MCNC: 8.9 MG/DL — SIGNIFICANT CHANGE UP (ref 8.6–10.2)
CHLORIDE SERPL-SCNC: 100 MMOL/L — SIGNIFICANT CHANGE UP (ref 98–107)
CHLORIDE SERPL-SCNC: 101 MMOL/L — SIGNIFICANT CHANGE UP (ref 98–107)
CHLORIDE SERPL-SCNC: 106 MMOL/L — SIGNIFICANT CHANGE UP (ref 98–107)
CO2 SERPL-SCNC: 14 MMOL/L — LOW (ref 22–29)
CO2 SERPL-SCNC: 14 MMOL/L — LOW (ref 22–29)
CO2 SERPL-SCNC: 16 MMOL/L — LOW (ref 22–29)
CREAT SERPL-MCNC: 0.3 MG/DL — LOW (ref 0.5–1.3)
CREAT SERPL-MCNC: 0.39 MG/DL — LOW (ref 0.5–1.3)
CREAT SERPL-MCNC: 0.47 MG/DL — LOW (ref 0.5–1.3)
GLUCOSE BLDC GLUCOMTR-MCNC: 159 MG/DL — HIGH (ref 70–99)
GLUCOSE BLDC GLUCOMTR-MCNC: 173 MG/DL — HIGH (ref 70–99)
GLUCOSE BLDC GLUCOMTR-MCNC: 183 MG/DL — HIGH (ref 70–99)
GLUCOSE BLDC GLUCOMTR-MCNC: 222 MG/DL — HIGH (ref 70–99)
GLUCOSE SERPL-MCNC: 192 MG/DL — HIGH (ref 70–115)
GLUCOSE SERPL-MCNC: 201 MG/DL — HIGH (ref 70–115)
GLUCOSE SERPL-MCNC: 226 MG/DL — HIGH (ref 70–115)
LACTATE SERPL-SCNC: 1.2 MMOL/L — SIGNIFICANT CHANGE UP (ref 0.5–2)
LIDOCAIN IGE QN: 134 U/L — HIGH (ref 22–51)
POTASSIUM SERPL-MCNC: 3.8 MMOL/L — SIGNIFICANT CHANGE UP (ref 3.5–5.3)
POTASSIUM SERPL-MCNC: 4 MMOL/L — SIGNIFICANT CHANGE UP (ref 3.5–5.3)
POTASSIUM SERPL-MCNC: 4.1 MMOL/L — SIGNIFICANT CHANGE UP (ref 3.5–5.3)
POTASSIUM SERPL-SCNC: 3.8 MMOL/L — SIGNIFICANT CHANGE UP (ref 3.5–5.3)
POTASSIUM SERPL-SCNC: 4 MMOL/L — SIGNIFICANT CHANGE UP (ref 3.5–5.3)
POTASSIUM SERPL-SCNC: 4.1 MMOL/L — SIGNIFICANT CHANGE UP (ref 3.5–5.3)
PROT SERPL-MCNC: 6.4 G/DL — LOW (ref 6.6–8.7)
SODIUM SERPL-SCNC: 134 MMOL/L — LOW (ref 135–145)
SODIUM SERPL-SCNC: 135 MMOL/L — SIGNIFICANT CHANGE UP (ref 135–145)
SODIUM SERPL-SCNC: 139 MMOL/L — SIGNIFICANT CHANGE UP (ref 135–145)
TRIGL SERPL-MCNC: 80 MG/DL — SIGNIFICANT CHANGE UP (ref 10–200)

## 2019-12-06 PROCEDURE — 99233 SBSQ HOSP IP/OBS HIGH 50: CPT

## 2019-12-06 PROCEDURE — 99232 SBSQ HOSP IP/OBS MODERATE 35: CPT

## 2019-12-06 RX ORDER — SODIUM CHLORIDE 9 MG/ML
1000 INJECTION, SOLUTION INTRAVENOUS
Refills: 0 | Status: COMPLETED | OUTPATIENT
Start: 2019-12-06 | End: 2019-12-06

## 2019-12-06 RX ORDER — FOLIC ACID 0.8 MG
1 TABLET ORAL DAILY
Refills: 0 | Status: DISCONTINUED | OUTPATIENT
Start: 2019-12-06 | End: 2019-12-07

## 2019-12-06 RX ADMIN — Medication 2: at 12:38

## 2019-12-06 RX ADMIN — Medication 4: at 17:14

## 2019-12-06 RX ADMIN — Medication 2: at 08:08

## 2019-12-06 RX ADMIN — SODIUM CHLORIDE 100 MILLILITER(S): 9 INJECTION, SOLUTION INTRAVENOUS at 21:33

## 2019-12-06 RX ADMIN — SODIUM CHLORIDE 150 MILLILITER(S): 9 INJECTION, SOLUTION INTRAVENOUS at 02:26

## 2019-12-06 RX ADMIN — SODIUM CHLORIDE 100 MILLILITER(S): 9 INJECTION, SOLUTION INTRAVENOUS at 17:36

## 2019-12-06 RX ADMIN — SODIUM CHLORIDE 100 MILLILITER(S): 9 INJECTION, SOLUTION INTRAVENOUS at 08:50

## 2019-12-06 RX ADMIN — Medication 1 TABLET(S): at 10:08

## 2019-12-06 RX ADMIN — ENOXAPARIN SODIUM 40 MILLIGRAM(S): 100 INJECTION SUBCUTANEOUS at 10:08

## 2019-12-06 RX ADMIN — INSULIN GLARGINE 15 UNIT(S): 100 INJECTION, SOLUTION SUBCUTANEOUS at 21:29

## 2019-12-06 RX ADMIN — Medication 2: at 21:32

## 2019-12-06 RX ADMIN — MORPHINE SULFATE 2 MILLIGRAM(S): 50 CAPSULE, EXTENDED RELEASE ORAL at 10:20

## 2019-12-06 RX ADMIN — CHLORHEXIDINE GLUCONATE 1 APPLICATION(S): 213 SOLUTION TOPICAL at 08:08

## 2019-12-06 RX ADMIN — MORPHINE SULFATE 2 MILLIGRAM(S): 50 CAPSULE, EXTENDED RELEASE ORAL at 10:08

## 2019-12-06 RX ADMIN — Medication 145 MILLIGRAM(S): at 10:08

## 2019-12-06 RX ADMIN — Medication 100 MILLIGRAM(S): at 10:08

## 2019-12-06 NOTE — PROGRESS NOTE ADULT - SUBJECTIVE AND OBJECTIVE BOX
INTERVAL HPI/OVERNIGHT EVENTS:  follow up diabetes    MEDICATIONS  (STANDING):  chlorhexidine 4% Liquid 1 Application(s) Topical <User Schedule>  dextrose 50% Injectable 25 Gram(s) IV Push once  dextrose 50% Injectable 12.5 Gram(s) IV Push once  enoxaparin Injectable 40 milliGRAM(s) SubCutaneous daily  fenofibrate Tablet 145 milliGRAM(s) Oral daily  influenza   Vaccine 0.5 milliLiter(s) IntraMuscular once  insulin glargine Injectable (LANTUS) 15 Unit(s) SubCutaneous at bedtime  insulin lispro (HumaLOG) corrective regimen sliding scale   SubCutaneous Before meals and at bedtime  multiple electrolytes Injection Type 1 1000 milliLiter(s) (100 mL/Hr) IV Continuous <Continuous>  multivitamin 1 Tablet(s) Oral daily  thiamine 100 milliGRAM(s) Oral daily    MEDICATIONS  (PRN):  morphine  - Injectable 2 milliGRAM(s) IV Push every 6 hours PRN Severe Pain (7 - 10)      Allergies    No Known Allergies    Intolerances        Review of systems: abdominal pain persists. Limited appetite    Vital Signs Last 24 Hrs  T(C): 37.3 (06 Dec 2019 09:47), Max: 37.6 (05 Dec 2019 20:00)  T(F): 99.1 (06 Dec 2019 09:47), Max: 99.7 (05 Dec 2019 20:00)  HR: 116 (06 Dec 2019 16:00) (106 - 121)  BP: 127/86 (06 Dec 2019 09:47) (127/86 - 152/82)  BP(mean): 98 (05 Dec 2019 23:00) (98 - 110)  RR: 18 (06 Dec 2019 09:47) (18 - 47)  SpO2: 96% (06 Dec 2019 09:47) (96% - 98%)    PHYSICAL EXAM:  NAD  cardiac rhythm regular  abdomen diffusely tender      LABS:                        16.0   7.67  )-----------( 173      ( 05 Dec 2019 06:50 )             46.2     12-06    135  |  101  |  6.0<L>  ----------------------------<  226<H>  3.8   |  14.0<L>  |  0.39<L>    Ca    8.7      06 Dec 2019 13:55  Phos  2.5     12-05  Mg     1.9     12-05    TPro  6.4<L>  /  Alb  3.2<L>  /  TBili  0.7  /  DBili  x   /  AST  16  /  ALT  21  /  AlkPhos  112  12-06          POCT Blood Glucose.: 173 mg/dL (12-06-19 @ 12:20)  POCT Blood Glucose.: 183 mg/dL (12-06-19 @ 07:43)  POCT Blood Glucose.: 173 mg/dL (12-05-19 @ 22:02)  POCT Blood Glucose.: 231 mg/dL (12-05-19 @ 18:57)  POCT Blood Glucose.: 197 mg/dL (12-05-19 @ 17:22)  POCT Blood Glucose.: 233 mg/dL (12-05-19 @ 16:15)  POCT Blood Glucose.: 183 mg/dL (12-05-19 @ 15:29)  POCT Blood Glucose.: 159 mg/dL (12-05-19 @ 14:08)  POCT Blood Glucose.: 169 mg/dL (12-05-19 @ 11:52)  POCT Blood Glucose.: 181 mg/dL (12-05-19 @ 11:04)  POCT Blood Glucose.: 180 mg/dL (12-05-19 @ 09:05)  POCT Blood Glucose.: 176 mg/dL (12-05-19 @ 08:02)  POCT Blood Glucose.: 196 mg/dL (12-05-19 @ 07:00)  POCT Blood Glucose.: 202 mg/dL (12-05-19 @ 06:05)  POCT Blood Glucose.: 202 mg/dL (12-05-19 @ 05:06)  POCT Blood Glucose.: 272 mg/dL (12-05-19 @ 04:05)  POCT Blood Glucose.: 191 mg/dL (12-05-19 @ 03:04)  POCT Blood Glucose.: 194 mg/dL (12-05-19 @ 02:12)  POCT Blood Glucose.: 179 mg/dL (12-05-19 @ 01:00)  POCT Blood Glucose.: 219 mg/dL (12-05-19 @ 00:12)  POCT Blood Glucose.: 290 mg/dL (12-04-19 @ 23:05)  POCT Blood Glucose.: 240 mg/dL (12-04-19 @ 21:59)  POCT Blood Glucose.: 236 mg/dL (12-04-19 @ 21:01)  POCT Blood Glucose.: 241 mg/dL (12-04-19 @ 19:56)  POCT Blood Glucose.: 241 mg/dL (12-04-19 @ 18:41)  POCT Blood Glucose.: 258 mg/dL (12-04-19 @ 18:08)  POCT Blood Glucose.: 245 mg/dL (12-04-19 @ 16:39)

## 2019-12-06 NOTE — PROGRESS NOTE ADULT - SUBJECTIVE AND OBJECTIVE BOX
Patient is a 24y old  Male who presents with a chief complaint of Abdominal pain (06 Dec 2019 16:03) improving abd pain       HEALTH ISSUES - PROBLEM Dx:  acute pancreatitis  hypertriglyceridemia   DKA   DM2     INTERVAL HPI/OVERNIGHT EVENTS:  Patient seen and examined at bedside. No acute events overnight. Patient states     Vital Signs Last 24 Hrs  T(C): 37.3 (06 Dec 2019 09:47), Max: 37.6 (05 Dec 2019 20:00)  T(F): 99.1 (06 Dec 2019 09:47), Max: 99.7 (05 Dec 2019 20:00)  HR: 116 (06 Dec 2019 16:00) (106 - 121)  BP: 127/86 (06 Dec 2019 09:47) (127/86 - 148/85)  BP(mean): 98 (05 Dec 2019 23:00) (98 - 110)  RR: 18 (06 Dec 2019 09:47) (18 - 47)  SpO2: 96% (06 Dec 2019 09:47) (96% - 98%)    CAPILLARY BLOOD GLUCOSE      POCT Blood Glucose.: 222 mg/dL (06 Dec 2019 17:01)  POCT Blood Glucose.: 173 mg/dL (06 Dec 2019 12:20)  POCT Blood Glucose.: 183 mg/dL (06 Dec 2019 07:43)  POCT Blood Glucose.: 173 mg/dL (05 Dec 2019 22:02)  POCT Blood Glucose.: 231 mg/dL (05 Dec 2019 18:57)      I&O's Summary    05 Dec 2019 07:01  -  06 Dec 2019 07:00  --------------------------------------------------------  IN: 1650 mL / OUT: 2150 mL / NET: -500 mL    06 Dec 2019 07:01  -  06 Dec 2019 18:34  --------------------------------------------------------  IN: 250 mL / OUT: 0 mL / NET: 250 mL        CONSTITUTIONAL: Obese Well appearing, well nourished, awake, alert and in no apparent distress  CARDIAC: Normal rate, regular rhythm.  Heart sounds S1, S2.  No murmurs, rubs or gallops   RESPIRATORY: Breath sounds clear and equal bilaterally. No wheezes, rhales or rhonchi  GASTROENTEROLOGY: Soft nt nd bs +normoactive   EXTREMITIES: No edema, cyanosis or deformity   NEUROLOGICAL: Alert and oriented, no focal deficits, no motor or sensory deficits.  SKIN: No rash, skin turgor wnl    MEDICATIONS  (STANDING):  chlorhexidine 4% Liquid 1 Application(s) Topical <User Schedule>  dextrose 50% Injectable 25 Gram(s) IV Push once  dextrose 50% Injectable 12.5 Gram(s) IV Push once  enoxaparin Injectable 40 milliGRAM(s) SubCutaneous daily  fenofibrate Tablet 145 milliGRAM(s) Oral daily  influenza   Vaccine 0.5 milliLiter(s) IntraMuscular once  insulin glargine Injectable (LANTUS) 15 Unit(s) SubCutaneous at bedtime  insulin lispro (HumaLOG) corrective regimen sliding scale   SubCutaneous Before meals and at bedtime  multiple electrolytes Injection Type 1 1000 milliLiter(s) (100 mL/Hr) IV Continuous <Continuous>  multivitamin 1 Tablet(s) Oral daily  thiamine 100 milliGRAM(s) Oral daily    MEDICATIONS  (PRN):  morphine  - Injectable 2 milliGRAM(s) IV Push every 6 hours PRN Severe Pain (7 - 10)      LABS:                        16.0   7.67  )-----------( 173      ( 05 Dec 2019 06:50 )             46.2     12-06    134<L>  |  100  |  6.0<L>  ----------------------------<  201<H>  4.0   |  16.0<L>  |  0.30<L>    Ca    8.9      06 Dec 2019 17:54  Phos  2.5     12-05  Mg     1.9     12-05    TPro  6.4<L>  /  Alb  3.2<L>  /  TBili  0.7  /  DBili  x   /  AST  16  /  ALT  21  /  AlkPhos  112  12-06        LIVER FUNCTIONS - ( 06 Dec 2019 06:09 )  Alb: 3.2 g/dL / Pro: 6.4 g/dL / ALK PHOS: 112 U/L / ALT: 21 U/L / AST: 16 U/L / GGT: x             RADIOLOGY & ADDITIONAL TESTS:

## 2019-12-06 NOTE — PROGRESS NOTE ADULT - ASSESSMENT
1: Acute pancreatitis   2: DKA  3: AG MA  4: Hypokalemia     Patient seen and examined     Neuro:   Pain Mx: none for now   Sedation/Anxiolytic: none  OOB as tolerated     Cardiovascular:   MAP Target: 65  HDS   IVF dextrose for now and normosol after Insulin finished     Resp/Chest:   On supplemental oxygen-> oxygenating and ventilating fine for now     Gi/Nutrition:   Diet: Consistent carb diet   IV PPI  Bowel Regimen as needed    ID:   no active issues     Nephro/Electrolyte/Acid-Base:   Avoid nephrotoxic agents  Strict I&O with Cr monitoring   Close Electrolyte monitoring and correction as needed     Endocrinology:   Just given Lantus stop insulin drip and Dextrose IVF in 2 hours and then ISS with coverage + PRN hypoglycemia Protocol with Lantus at bedtime and repeat BMp at 6 pm prior to transfer out of MICU   Endo consulted  A1c: 11    Haem/Oncology:   Mechanical and Chemical DVT ppx
Diabetes control appears stable. However BMP appears to show an increased anion gap. Will with lactic acid level and serum acetone; doubt DKA.
23 y/o male with no PMH who came to the ED for abdominal pain CT abdomen consistent with acute pancreatitis likely due to alcohol as well as hypertriglyceridemia. Patient also in DKA on presentation. He was admitted to MICU, started on insulin drip. AG closed, TG trending down. HbA1c: 11.8. Endocrinology on board. Now on Lantus and insulin sliding scale.     Acute pancreatitis   Likely alcohol/TG induced   Gently hydration   Pain management     DKA   Off insulin drip   Continue Lantus 15units HS   Insulin sliding scale   Diabetes education   Nutritionist consult in AM   Endo on board     Hypertriglyceridemia   TG tending down   Continue Fenofibrate 145mg     Hypokalemia   K: 3.2  Supplemented   Monitor BMP     Supportive   DVT prophylaxis: Lovenox   Diet: carbohydrate consistent
23 y/o male with no PMH who came to the ED for abdominal pain CT abdomen consistent with acute pancreatitis likely due to alcohol as well as hypertriglyceridemia. Patient also in DKA on presentation. He was admitted to MICU, started on insulin drip. AG closed, TG trending down. HbA1c: 11.8. Endocrinology on board. Now on Lantus and insulin sliding scale.     Acute pancreatitis   Likely alcohol/TG induced   Gently hydration   Pain management     DKA   Off insulin drip   Continue Lantus 15units HS   Insulin sliding scale   Diabetes education   Nutritionist consult in AM   Endo on board     Hypertriglyceridemia   TG tending down   Continue Fenofibrate 145mg     Hypokalemia   K: 3.2  Supplemented   Monitor BMP     Supportive   DVT prophylaxis: Lovenox   Diet: carbohydrate consistent

## 2019-12-07 VITALS
OXYGEN SATURATION: 95 % | DIASTOLIC BLOOD PRESSURE: 84 MMHG | SYSTOLIC BLOOD PRESSURE: 128 MMHG | HEART RATE: 99 BPM | TEMPERATURE: 99 F

## 2019-12-07 DIAGNOSIS — E13.10 OTHER SPECIFIED DIABETES MELLITUS WITH KETOACIDOSIS WITHOUT COMA: ICD-10-CM

## 2019-12-07 LAB
ANION GAP SERPL CALC-SCNC: 21 MMOL/L — HIGH (ref 5–17)
B-OH-BUTYR SERPL-SCNC: 3.5 MMOL/L — HIGH
BUN SERPL-MCNC: 6 MG/DL — LOW (ref 8–20)
CALCIUM SERPL-MCNC: 8.3 MG/DL — LOW (ref 8.6–10.2)
CHLORIDE SERPL-SCNC: 101 MMOL/L — SIGNIFICANT CHANGE UP (ref 98–107)
CO2 SERPL-SCNC: 14 MMOL/L — LOW (ref 22–29)
CREAT SERPL-MCNC: 0.41 MG/DL — LOW (ref 0.5–1.3)
GLUCOSE BLDC GLUCOMTR-MCNC: 141 MG/DL — HIGH (ref 70–99)
GLUCOSE BLDC GLUCOMTR-MCNC: 178 MG/DL — HIGH (ref 70–99)
GLUCOSE SERPL-MCNC: 173 MG/DL — HIGH (ref 70–115)
POTASSIUM SERPL-MCNC: 3.3 MMOL/L — LOW (ref 3.5–5.3)
POTASSIUM SERPL-SCNC: 3.3 MMOL/L — LOW (ref 3.5–5.3)
SODIUM SERPL-SCNC: 136 MMOL/L — SIGNIFICANT CHANGE UP (ref 135–145)

## 2019-12-07 PROCEDURE — 80048 BASIC METABOLIC PNL TOTAL CA: CPT

## 2019-12-07 PROCEDURE — 99285 EMERGENCY DEPT VISIT HI MDM: CPT | Mod: 25

## 2019-12-07 PROCEDURE — 83735 ASSAY OF MAGNESIUM: CPT

## 2019-12-07 PROCEDURE — 99239 HOSP IP/OBS DSCHRG MGMT >30: CPT

## 2019-12-07 PROCEDURE — 82962 GLUCOSE BLOOD TEST: CPT

## 2019-12-07 PROCEDURE — 84132 ASSAY OF SERUM POTASSIUM: CPT

## 2019-12-07 PROCEDURE — 82330 ASSAY OF CALCIUM: CPT

## 2019-12-07 PROCEDURE — G0480: CPT

## 2019-12-07 PROCEDURE — 83036 HEMOGLOBIN GLYCOSYLATED A1C: CPT

## 2019-12-07 PROCEDURE — 36600 WITHDRAWAL OF ARTERIAL BLOOD: CPT

## 2019-12-07 PROCEDURE — 84484 ASSAY OF TROPONIN QUANT: CPT

## 2019-12-07 PROCEDURE — 71045 X-RAY EXAM CHEST 1 VIEW: CPT

## 2019-12-07 PROCEDURE — 74177 CT ABD & PELVIS W/CONTRAST: CPT

## 2019-12-07 PROCEDURE — 96375 TX/PRO/DX INJ NEW DRUG ADDON: CPT

## 2019-12-07 PROCEDURE — 83930 ASSAY OF BLOOD OSMOLALITY: CPT

## 2019-12-07 PROCEDURE — 84295 ASSAY OF SERUM SODIUM: CPT

## 2019-12-07 PROCEDURE — 82009 KETONE BODYS QUAL: CPT

## 2019-12-07 PROCEDURE — 83605 ASSAY OF LACTIC ACID: CPT

## 2019-12-07 PROCEDURE — 84478 ASSAY OF TRIGLYCERIDES: CPT

## 2019-12-07 PROCEDURE — 85027 COMPLETE CBC AUTOMATED: CPT

## 2019-12-07 PROCEDURE — 82010 KETONE BODYS QUAN: CPT

## 2019-12-07 PROCEDURE — 82435 ASSAY OF BLOOD CHLORIDE: CPT

## 2019-12-07 PROCEDURE — 81001 URINALYSIS AUTO W/SCOPE: CPT

## 2019-12-07 PROCEDURE — 83690 ASSAY OF LIPASE: CPT

## 2019-12-07 PROCEDURE — 76705 ECHO EXAM OF ABDOMEN: CPT

## 2019-12-07 PROCEDURE — 87040 BLOOD CULTURE FOR BACTERIA: CPT

## 2019-12-07 PROCEDURE — 82150 ASSAY OF AMYLASE: CPT

## 2019-12-07 PROCEDURE — 82803 BLOOD GASES ANY COMBINATION: CPT

## 2019-12-07 PROCEDURE — 82947 ASSAY GLUCOSE BLOOD QUANT: CPT

## 2019-12-07 PROCEDURE — 80053 COMPREHEN METABOLIC PANEL: CPT

## 2019-12-07 PROCEDURE — 87086 URINE CULTURE/COLONY COUNT: CPT

## 2019-12-07 PROCEDURE — 93005 ELECTROCARDIOGRAM TRACING: CPT

## 2019-12-07 PROCEDURE — 96361 HYDRATE IV INFUSION ADD-ON: CPT

## 2019-12-07 PROCEDURE — 36415 COLL VENOUS BLD VENIPUNCTURE: CPT

## 2019-12-07 PROCEDURE — 85014 HEMATOCRIT: CPT

## 2019-12-07 PROCEDURE — 84100 ASSAY OF PHOSPHORUS: CPT

## 2019-12-07 PROCEDURE — 80307 DRUG TEST PRSMV CHEM ANLYZR: CPT

## 2019-12-07 PROCEDURE — 96374 THER/PROPH/DIAG INJ IV PUSH: CPT | Mod: XU

## 2019-12-07 RX ORDER — FOLIC ACID 0.8 MG
1 TABLET ORAL
Qty: 0 | Refills: 0 | DISCHARGE
Start: 2019-12-07

## 2019-12-07 RX ORDER — POTASSIUM CHLORIDE 20 MEQ
40 PACKET (EA) ORAL ONCE
Refills: 0 | Status: COMPLETED | OUTPATIENT
Start: 2019-12-07 | End: 2019-12-07

## 2019-12-07 RX ORDER — INSULIN NPH HUM/REG INSULIN HM 70-30/ML
8 VIAL (ML) SUBCUTANEOUS
Qty: 2 | Refills: 0
Start: 2019-12-07 | End: 2020-01-05

## 2019-12-07 RX ORDER — FENOFIBRATE,MICRONIZED 130 MG
1 CAPSULE ORAL
Qty: 30 | Refills: 0
Start: 2019-12-07 | End: 2020-01-05

## 2019-12-07 RX ORDER — THIAMINE MONONITRATE (VIT B1) 100 MG
1 TABLET ORAL
Qty: 0 | Refills: 0 | DISCHARGE
Start: 2019-12-07

## 2019-12-07 RX ADMIN — Medication 1 TABLET(S): at 11:40

## 2019-12-07 RX ADMIN — Medication 40 MILLIEQUIVALENT(S): at 11:39

## 2019-12-07 RX ADMIN — Medication 2: at 11:40

## 2019-12-07 RX ADMIN — ENOXAPARIN SODIUM 40 MILLIGRAM(S): 100 INJECTION SUBCUTANEOUS at 11:40

## 2019-12-07 RX ADMIN — Medication 145 MILLIGRAM(S): at 11:40

## 2019-12-07 RX ADMIN — Medication 1 MILLIGRAM(S): at 11:40

## 2019-12-07 RX ADMIN — CHLORHEXIDINE GLUCONATE 1 APPLICATION(S): 213 SOLUTION TOPICAL at 06:46

## 2019-12-07 RX ADMIN — Medication 100 MILLIGRAM(S): at 11:40

## 2019-12-07 NOTE — DISCHARGE NOTE NURSING/CASE MANAGEMENT/SOCIAL WORK - NSDCFUADDAPPT_GEN_ALL_CORE_FT
Please follow up with the [facility] clinic in 1 week. Please follow up with the diabetic specialist there for further education and management.

## 2019-12-07 NOTE — DISCHARGE NOTE PROVIDER - NSDCCPCAREPLAN_GEN_ALL_CORE_FT
PRINCIPAL DISCHARGE DIAGNOSIS  Diagnosis: Acute pancreatitis  Assessment and Plan of Treatment: Continue with abstinence from alcohol abuse, please continue with hydration.      SECONDARY DISCHARGE DIAGNOSES  Diagnosis: Diabetic ketoacidosis without coma associated with other specified diabetes mellitus  Assessment and Plan of Treatment: Continue with diabetes medications as prescribed. Maintain a journal of your sugar levels and take this with you for you rappointment with [facility] clinic. Please continue on low carbohydrate diet.    Diagnosis: Alcohol abuse  Assessment and Plan of Treatment: Continue with abstinence from alcohol abuse. Please follow up with alcohol anonymous.    Diagnosis: Hypertriglyceridemia  Assessment and Plan of Treatment: Continue with medication as prescribed, please continue with low cholesterol/ low salt diet.

## 2019-12-07 NOTE — DISCHARGE NOTE PROVIDER - CARE PROVIDER_API CALL
Chivo Huber)  EndocrinologyMetabDiabetes; Internal Medicine  [address] [place] [address]  Phone: [phone]  Fax: [phone]  Follow Up Time:

## 2019-12-07 NOTE — DISCHARGE NOTE PROVIDER - CARE PROVIDERS DIRECT ADDRESSES
[email or web address]

## 2019-12-07 NOTE — DISCHARGE NOTE PROVIDER - HOSPITAL COURSE
23 y/o male with hx of alcohol abuse who presented to the ED for abdominal pain/NV/V with elevated lipase and CT abdomen consistent with acute pancreatitis. Also noted with elevated triglyceride level >1000 range and noted with elevated [id number] DKA. Pt admitted with acute pancreatitis 2/2 alcohol abuse/ hypertriglyceredemia with sequela of DKA. Pt admitted to the MICU placed on insulin drip/IVF and pain medication with improvement in glucose and AG closed. Thereafter placed on fenofibrate. Clinically advanced diet without any complications, d/c IVF and pt has had stable fs on injectable insulin. Pt is uninsured and is to f/u with [facility] clinic, DM educator and to c/w management. Pt medically stable to be discharged home to f/u with [facility] this week. Pt counselled on cessation from alcohol abuse, sw consulted and referral for AA given.             Vital Signs Last 24 Hrs    T(C): 37 (07 Dec 2019 08:25), Max: 37.6 (06 Dec 2019 18:37)    T(F): 98.6 (07 Dec 2019 08:25), Max: 99.6 (06 Dec 2019 18:37)    HR: 99 (07 Dec 2019 08:25) (99 - 116)    BP: 128/84 (07 Dec 2019 08:25) (118/78 - 128/84)    BP(mean): --    RR: 24 (06 Dec 2019 18:37) (24 - 24)    SpO2: 95% (07 Dec 2019 08:25) (95% - 95%)            CONSTITUTIONAL: Obese Well appearing, well nourished, awake, alert and in no apparent distress    CARDIAC: Normal rate, regular rhythm.  Heart sounds S1, S2.  No murmurs, rubs or gallops     RESPIRATORY: Breath sounds clear and equal bilaterally. No wheezes, rhales or rhonchi    GASTROENTEROLOGY: Soft nt nd bs +normoactive     EXTREMITIES: No edema, cyanosis or deformity     NEUROLOGICAL: Alert and oriented, no focal deficits, no motor or sensory deficits.    SKIN: No rash, skin turgor wnl                Discharge planning took 47 minutes

## 2019-12-07 NOTE — DISCHARGE NOTE NURSING/CASE MANAGEMENT/SOCIAL WORK - PATIENT PORTAL LINK FT
You can access the FollowMyHealth Patient Portal offered by [facility] by registering at the following website: [email or web address]. By joining [employer]’s FollowMyHealth portal, you will also be able to view your health information using other applications (apps) compatible with our system.

## 2019-12-07 NOTE — DISCHARGE NOTE PROVIDER - NSDCFUADDAPPT_GEN_ALL_CORE_FT
Please follow up with the [facility] clinic in 1 week. Please follow up with the diabetic specialist there for further education and management.

## 2019-12-07 NOTE — DISCHARGE NOTE PROVIDER - NSDCMRMEDTOKEN_GEN_ALL_CORE_FT
DME: Glucometer   DME: Test strips   DME: LANCETS  DME: Pen needles   fenofibrate 145 mg oral tablet: 1 tab(s) orally once a day  folic acid 1 mg oral tablet: 1 tab(s) orally once a day  Multiple Vitamins oral tablet: 1 tab(s) orally once a day  NovoLIN 70/30 FlexPen subcutaneous suspension: 8 unit(s) subcutaneous 2 times a day   Percocet 5/325 oral tablet: 1 tab(s) orally every 8 hours MDD:3 pills  thiamine 100 mg oral tablet: 1 tab(s) orally once a day

## 2019-12-09 LAB
ACETONE, GCMS SCREEN URN: 96 MG/DL
ISOPROPANOL GCMS: SIGNIFICANT CHANGE UP MG/DL

## 2019-12-10 LAB
CULTURE RESULTS: SIGNIFICANT CHANGE UP
CULTURE RESULTS: SIGNIFICANT CHANGE UP
SPECIMEN SOURCE: SIGNIFICANT CHANGE UP
SPECIMEN SOURCE: SIGNIFICANT CHANGE UP

## 2020-05-29 NOTE — H&P ADULT - NSHPSOCIALHISTORY_GEN_ALL_CORE
Alcohol on weekends Counseling Text: I reviewed the side effect in detail. Patient should get monthly blood tests, not donate blood, not drive at night if vision affected, and not share medication.

## 2021-01-18 NOTE — DISCHARGE NOTE PROVIDER - NSDCHC_MEDRECSTATUS_GEN_ALL_CORE
The patient was personally seen and examined, in addition to being examined and evaluated by NP.  All elements of the note were edited where appropriate.    hr accel, meds to be adjusted  renal fxn worse  inr supertherapeutic, hold warfarin and watch for bleeding Admission Reconciliation is Not Complete  Discharge Reconciliation is Not Complete Admission Reconciliation is Completed  Discharge Reconciliation is Completed

## 2021-10-17 ENCOUNTER — INPATIENT (INPATIENT)
Facility: HOSPITAL | Age: 26
LOS: 1 days | Discharge: HOME | DRG: 897 | End: 2021-10-19
Attending: STUDENT IN AN ORGANIZED HEALTH CARE EDUCATION/TRAINING PROGRAM | Admitting: STUDENT IN AN ORGANIZED HEALTH CARE EDUCATION/TRAINING PROGRAM
Payer: SELF-PAY

## 2021-10-17 VITALS
HEIGHT: 64 IN | RESPIRATION RATE: 18 BRPM | TEMPERATURE: 98 F | SYSTOLIC BLOOD PRESSURE: 134 MMHG | DIASTOLIC BLOOD PRESSURE: 83 MMHG | HEART RATE: 116 BPM | OXYGEN SATURATION: 98 %

## 2021-10-17 LAB
ACETONE SERPL-MCNC: NEGATIVE — SIGNIFICANT CHANGE UP
ALBUMIN SERPL ELPH-MCNC: 5 G/DL — SIGNIFICANT CHANGE UP (ref 3.3–5.2)
ALP SERPL-CCNC: 121 U/L — HIGH (ref 40–120)
ALT FLD-CCNC: 22 U/L — SIGNIFICANT CHANGE UP
ANION GAP SERPL CALC-SCNC: 25 MMOL/L — HIGH (ref 5–17)
APPEARANCE UR: ABNORMAL
AST SERPL-CCNC: 18 U/L — SIGNIFICANT CHANGE UP
BASE EXCESS BLDV CALC-SCNC: -5.5 MMOL/L — LOW (ref -2–3)
BASOPHILS # BLD AUTO: 0.02 K/UL — SIGNIFICANT CHANGE UP (ref 0–0.2)
BASOPHILS NFR BLD AUTO: 0.3 % — SIGNIFICANT CHANGE UP (ref 0–2)
BILIRUB SERPL-MCNC: 0.2 MG/DL — LOW (ref 0.4–2)
BILIRUB UR-MCNC: NEGATIVE — SIGNIFICANT CHANGE UP
BUN SERPL-MCNC: 9.8 MG/DL — SIGNIFICANT CHANGE UP (ref 8–20)
CA-I SERPL-SCNC: 1.16 MMOL/L — SIGNIFICANT CHANGE UP (ref 1.15–1.33)
CALCIUM SERPL-MCNC: 9.5 MG/DL — SIGNIFICANT CHANGE UP (ref 8.6–10.2)
CHLORIDE BLDV-SCNC: 103 MMOL/L — SIGNIFICANT CHANGE UP (ref 98–107)
CHLORIDE SERPL-SCNC: 101 MMOL/L — SIGNIFICANT CHANGE UP (ref 98–107)
CHOLEST SERPL-MCNC: 258 MG/DL — HIGH
CO2 SERPL-SCNC: 17 MMOL/L — LOW (ref 22–29)
COLOR SPEC: YELLOW — SIGNIFICANT CHANGE UP
CREAT SERPL-MCNC: 0.62 MG/DL — SIGNIFICANT CHANGE UP (ref 0.5–1.3)
DIFF PNL FLD: ABNORMAL
EOSINOPHIL # BLD AUTO: 0.01 K/UL — SIGNIFICANT CHANGE UP (ref 0–0.5)
EOSINOPHIL NFR BLD AUTO: 0.2 % — SIGNIFICANT CHANGE UP (ref 0–6)
EPI CELLS # UR: SIGNIFICANT CHANGE UP
GAS PNL BLDV: 141 MMOL/L — SIGNIFICANT CHANGE UP (ref 136–145)
GAS PNL BLDV: SIGNIFICANT CHANGE UP
GLUCOSE BLDV-MCNC: 369 MG/DL — HIGH (ref 70–99)
GLUCOSE SERPL-MCNC: 329 MG/DL — HIGH (ref 70–99)
GLUCOSE UR QL: 1000 MG/DL
HCO3 BLDV-SCNC: 21 MMOL/L — LOW (ref 22–29)
HCT VFR BLD CALC: 49.4 % — SIGNIFICANT CHANGE UP (ref 39–50)
HDLC SERPL-MCNC: 50 MG/DL — SIGNIFICANT CHANGE UP
HGB BLD-MCNC: 17.4 G/DL — HIGH (ref 13–17)
IMM GRANULOCYTES NFR BLD AUTO: 0.2 % — SIGNIFICANT CHANGE UP (ref 0–1.5)
KETONES UR-MCNC: ABNORMAL
LACTATE BLDV-MCNC: 4 MMOL/L — CRITICAL HIGH (ref 0.5–2)
LEUKOCYTE ESTERASE UR-ACNC: NEGATIVE — SIGNIFICANT CHANGE UP
LIDOCAIN IGE QN: 36 U/L — SIGNIFICANT CHANGE UP (ref 22–51)
LIPID PNL WITH DIRECT LDL SERPL: SIGNIFICANT CHANGE UP MG/DL
LYMPHOCYTES # BLD AUTO: 2.72 K/UL — SIGNIFICANT CHANGE UP (ref 1–3.3)
LYMPHOCYTES # BLD AUTO: 47.1 % — HIGH (ref 13–44)
MCHC RBC-ENTMCNC: 31.7 PG — SIGNIFICANT CHANGE UP (ref 27–34)
MCHC RBC-ENTMCNC: 35.2 GM/DL — SIGNIFICANT CHANGE UP (ref 32–36)
MCV RBC AUTO: 90 FL — SIGNIFICANT CHANGE UP (ref 80–100)
MONOCYTES # BLD AUTO: 0.28 K/UL — SIGNIFICANT CHANGE UP (ref 0–0.9)
MONOCYTES NFR BLD AUTO: 4.8 % — SIGNIFICANT CHANGE UP (ref 2–14)
NEUTROPHILS # BLD AUTO: 2.74 K/UL — SIGNIFICANT CHANGE UP (ref 1.8–7.4)
NEUTROPHILS NFR BLD AUTO: 47.4 % — SIGNIFICANT CHANGE UP (ref 43–77)
NITRITE UR-MCNC: NEGATIVE — SIGNIFICANT CHANGE UP
NON HDL CHOLESTEROL: 208 MG/DL — HIGH
PCO2 BLDV: 40 MMHG — LOW (ref 42–55)
PH BLDV: 7.32 — SIGNIFICANT CHANGE UP (ref 7.32–7.43)
PH UR: 6 — SIGNIFICANT CHANGE UP (ref 5–8)
PLATELET # BLD AUTO: 238 K/UL — SIGNIFICANT CHANGE UP (ref 150–400)
PO2 BLDV: 127 MMHG — HIGH (ref 25–45)
POTASSIUM BLDV-SCNC: 4.1 MMOL/L — SIGNIFICANT CHANGE UP (ref 3.5–5.1)
POTASSIUM SERPL-MCNC: 4 MMOL/L — SIGNIFICANT CHANGE UP (ref 3.5–5.3)
POTASSIUM SERPL-SCNC: 4 MMOL/L — SIGNIFICANT CHANGE UP (ref 3.5–5.3)
PROT SERPL-MCNC: 9 G/DL — HIGH (ref 6.6–8.7)
PROT UR-MCNC: 30 MG/DL
RBC # BLD: 5.49 M/UL — SIGNIFICANT CHANGE UP (ref 4.2–5.8)
RBC # FLD: 12.2 % — SIGNIFICANT CHANGE UP (ref 10.3–14.5)
RBC CASTS # UR COMP ASSIST: SIGNIFICANT CHANGE UP /HPF (ref 0–4)
SAO2 % BLDV: 99.2 % — SIGNIFICANT CHANGE UP
SODIUM SERPL-SCNC: 142 MMOL/L — SIGNIFICANT CHANGE UP (ref 135–145)
SP GR SPEC: 1.02 — SIGNIFICANT CHANGE UP (ref 1.01–1.02)
TRIGL SERPL-MCNC: 482 MG/DL — HIGH
UROBILINOGEN FLD QL: NEGATIVE MG/DL — SIGNIFICANT CHANGE UP
WBC # BLD: 5.78 K/UL — SIGNIFICANT CHANGE UP (ref 3.8–10.5)
WBC # FLD AUTO: 5.78 K/UL — SIGNIFICANT CHANGE UP (ref 3.8–10.5)
WBC UR QL: NEGATIVE — SIGNIFICANT CHANGE UP

## 2021-10-17 PROCEDURE — 99285 EMERGENCY DEPT VISIT HI MDM: CPT

## 2021-10-17 RX ORDER — MORPHINE SULFATE 50 MG/1
4 CAPSULE, EXTENDED RELEASE ORAL ONCE
Refills: 0 | Status: DISCONTINUED | OUTPATIENT
Start: 2021-10-17 | End: 2021-10-17

## 2021-10-17 RX ORDER — SODIUM CHLORIDE 9 MG/ML
1000 INJECTION INTRAMUSCULAR; INTRAVENOUS; SUBCUTANEOUS ONCE
Refills: 0 | Status: COMPLETED | OUTPATIENT
Start: 2021-10-17 | End: 2021-10-17

## 2021-10-17 RX ADMIN — MORPHINE SULFATE 4 MILLIGRAM(S): 50 CAPSULE, EXTENDED RELEASE ORAL at 22:08

## 2021-10-17 RX ADMIN — SODIUM CHLORIDE 1000 MILLILITER(S): 9 INJECTION INTRAMUSCULAR; INTRAVENOUS; SUBCUTANEOUS at 23:10

## 2021-10-17 RX ADMIN — MORPHINE SULFATE 4 MILLIGRAM(S): 50 CAPSULE, EXTENDED RELEASE ORAL at 22:20

## 2021-10-17 NOTE — ED PROVIDER NOTE - PROGRESS NOTE DETAILS
Carol CUMMINGS: CTAP negative, lipase negative. Patient continues to be symptomatic despite pain meds. CIWA 7. valium ordered. Pain possibly from alcoholic gastritis, carafate ordered.

## 2021-10-17 NOTE — ED PROVIDER NOTE - CLINICAL SUMMARY MEDICAL DECISION MAKING FREE TEXT BOX
Patient with Hx of pancreatitis, high triglycerides, and DKA, presents with abdominal pain. Will get labs, Lipase, Acetone, Lipid panel, and IV fluids. Patient with Hx of pancreatitis, high triglycerides, and DKA, presents with abdominal pain. Will get labs, Lipase, Acetone, Lipid panel, pain control, IVF, CTAP, reassess.

## 2021-10-17 NOTE — ED PROVIDER NOTE - OBJECTIVE STATEMENT
25 y/o male with PMHx of pancreatitis and DKA. Patient c/o abdominal pain since yesterday. Patient admits drinking today and drinks beer 4-5 days a week. Denies nausea, vomiting, allergies or drugs. 25 y/o male with PMHx of hypertriglyceridemic pancreatitis and DKA. Patient c/o abdominal pain since yesterday. Patient admits drinking today and drinks beer 4-5 days a week. Denies nausea, vomiting, fever, CP, SOB, allergies or drugs.

## 2021-10-17 NOTE — ED PROVIDER NOTE - PHYSICAL EXAMINATION
Gen: Well appearing in NAD  Head: NC/AT  Neck: trachea midline  Cardiac: tachycardiac, Regular Rhythm   Resp:  No distress, CTAB  Ext: no deformities  Abd: soft, non distended, +epigastric tenderness with guarding.   Neuro:  A&O appears non focal  Skin:  Warm and dry as visualized  Psych:  Normal affect and mood Gen: Well appearing in mild distress  Head: NC/AT  Neck: trachea midline  Cardiac: tachycardiac, Regular Rhythm   Resp:  No distress, CTAB  Ext: no deformities  Abd: soft, non distended, +epigastric tenderness with guarding.   Neuro:  A&O appears non focal  Skin:  Warm and dry as visualized  Psych:  Normal affect and mood

## 2021-10-17 NOTE — ED ADULT TRIAGE NOTE - CHIEF COMPLAINT QUOTE
Ambulatory complaining of abdominal pain that started yesterday. Reports PMH of pancreatitis, admits to daily drinking, stopped yesterday to see if the pain would go away and it hasn't. Denies N/V.

## 2021-10-17 NOTE — ED PROVIDER NOTE - CARE PLAN
Principal Discharge DX:	Alcohol withdrawal  Secondary Diagnosis:	Abdominal pain  Secondary Diagnosis:	Nausea and vomiting   1

## 2021-10-18 DIAGNOSIS — F10.239 ALCOHOL DEPENDENCE WITH WITHDRAWAL, UNSPECIFIED: ICD-10-CM

## 2021-10-18 LAB
A1C WITH ESTIMATED AVERAGE GLUCOSE RESULT: 13.6 % — HIGH (ref 4–5.6)
ANION GAP SERPL CALC-SCNC: 20 MMOL/L — HIGH (ref 5–17)
BUN SERPL-MCNC: 9.2 MG/DL — SIGNIFICANT CHANGE UP (ref 8–20)
CALCIUM SERPL-MCNC: 8.5 MG/DL — LOW (ref 8.6–10.2)
CHLORIDE SERPL-SCNC: 102 MMOL/L — SIGNIFICANT CHANGE UP (ref 98–107)
CO2 SERPL-SCNC: 18 MMOL/L — LOW (ref 22–29)
CREAT SERPL-MCNC: 0.47 MG/DL — LOW (ref 0.5–1.3)
ESTIMATED AVERAGE GLUCOSE: 344 MG/DL — HIGH (ref 68–114)
GLUCOSE BLDC GLUCOMTR-MCNC: 129 MG/DL — HIGH (ref 70–99)
GLUCOSE BLDC GLUCOMTR-MCNC: 190 MG/DL — HIGH (ref 70–99)
GLUCOSE BLDC GLUCOMTR-MCNC: 218 MG/DL — HIGH (ref 70–99)
GLUCOSE BLDC GLUCOMTR-MCNC: 271 MG/DL — HIGH (ref 70–99)
GLUCOSE SERPL-MCNC: 247 MG/DL — HIGH (ref 70–99)
LACTATE BLDV-MCNC: 2.8 MMOL/L — HIGH (ref 0.5–2)
POTASSIUM SERPL-MCNC: 4.1 MMOL/L — SIGNIFICANT CHANGE UP (ref 3.5–5.3)
POTASSIUM SERPL-SCNC: 4.1 MMOL/L — SIGNIFICANT CHANGE UP (ref 3.5–5.3)
RAPID RVP RESULT: SIGNIFICANT CHANGE UP
SARS-COV-2 RNA SPEC QL NAA+PROBE: SIGNIFICANT CHANGE UP
SODIUM SERPL-SCNC: 140 MMOL/L — SIGNIFICANT CHANGE UP (ref 135–145)

## 2021-10-18 PROCEDURE — 12345: CPT | Mod: NC

## 2021-10-18 PROCEDURE — 99222 1ST HOSP IP/OBS MODERATE 55: CPT

## 2021-10-18 PROCEDURE — G1004: CPT

## 2021-10-18 PROCEDURE — 74177 CT ABD & PELVIS W/CONTRAST: CPT | Mod: 26,MG

## 2021-10-18 RX ORDER — MORPHINE SULFATE 50 MG/1
4 CAPSULE, EXTENDED RELEASE ORAL ONCE
Refills: 0 | Status: DISCONTINUED | OUTPATIENT
Start: 2021-10-18 | End: 2021-10-18

## 2021-10-18 RX ORDER — DIAZEPAM 5 MG
5 TABLET ORAL ONCE
Refills: 0 | Status: DISCONTINUED | OUTPATIENT
Start: 2021-10-18 | End: 2021-10-18

## 2021-10-18 RX ORDER — PANTOPRAZOLE SODIUM 20 MG/1
40 TABLET, DELAYED RELEASE ORAL
Refills: 0 | Status: DISCONTINUED | OUTPATIENT
Start: 2021-10-18 | End: 2021-10-19

## 2021-10-18 RX ORDER — SODIUM CHLORIDE 9 MG/ML
1000 INJECTION, SOLUTION INTRAVENOUS
Refills: 0 | Status: DISCONTINUED | OUTPATIENT
Start: 2021-10-18 | End: 2021-10-19

## 2021-10-18 RX ORDER — MORPHINE SULFATE 50 MG/1
2 CAPSULE, EXTENDED RELEASE ORAL EVERY 6 HOURS
Refills: 0 | Status: DISCONTINUED | OUTPATIENT
Start: 2021-10-18 | End: 2021-10-19

## 2021-10-18 RX ORDER — INSULIN GLARGINE 100 [IU]/ML
15 INJECTION, SOLUTION SUBCUTANEOUS AT BEDTIME
Refills: 0 | Status: DISCONTINUED | OUTPATIENT
Start: 2021-10-18 | End: 2021-10-19

## 2021-10-18 RX ORDER — MAGNESIUM SULFATE 500 MG/ML
2 VIAL (ML) INJECTION ONCE
Refills: 0 | Status: COMPLETED | OUTPATIENT
Start: 2021-10-18 | End: 2021-10-18

## 2021-10-18 RX ORDER — HUMAN INSULIN 100 [IU]/ML
8 INJECTION, SUSPENSION SUBCUTANEOUS AT BEDTIME
Refills: 0 | Status: DISCONTINUED | OUTPATIENT
Start: 2021-10-18 | End: 2021-10-18

## 2021-10-18 RX ORDER — INSULIN LISPRO 100/ML
VIAL (ML) SUBCUTANEOUS
Refills: 0 | Status: DISCONTINUED | OUTPATIENT
Start: 2021-10-18 | End: 2021-10-19

## 2021-10-18 RX ORDER — DEXTROSE 50 % IN WATER 50 %
12.5 SYRINGE (ML) INTRAVENOUS ONCE
Refills: 0 | Status: DISCONTINUED | OUTPATIENT
Start: 2021-10-18 | End: 2021-10-19

## 2021-10-18 RX ORDER — DEXTROSE 50 % IN WATER 50 %
25 SYRINGE (ML) INTRAVENOUS ONCE
Refills: 0 | Status: DISCONTINUED | OUTPATIENT
Start: 2021-10-18 | End: 2021-10-19

## 2021-10-18 RX ORDER — THIAMINE MONONITRATE (VIT B1) 100 MG
500 TABLET ORAL EVERY 8 HOURS
Refills: 0 | Status: DISCONTINUED | OUTPATIENT
Start: 2021-10-18 | End: 2021-10-19

## 2021-10-18 RX ORDER — DEXTROSE 50 % IN WATER 50 %
15 SYRINGE (ML) INTRAVENOUS ONCE
Refills: 0 | Status: DISCONTINUED | OUTPATIENT
Start: 2021-10-18 | End: 2021-10-19

## 2021-10-18 RX ORDER — GLUCAGON INJECTION, SOLUTION 0.5 MG/.1ML
1 INJECTION, SOLUTION SUBCUTANEOUS ONCE
Refills: 0 | Status: DISCONTINUED | OUTPATIENT
Start: 2021-10-18 | End: 2021-10-19

## 2021-10-18 RX ORDER — SUCRALFATE 1 G
1 TABLET ORAL ONCE
Refills: 0 | Status: COMPLETED | OUTPATIENT
Start: 2021-10-18 | End: 2021-10-18

## 2021-10-18 RX ORDER — ONDANSETRON 8 MG/1
4 TABLET, FILM COATED ORAL ONCE
Refills: 0 | Status: COMPLETED | OUTPATIENT
Start: 2021-10-18 | End: 2021-10-18

## 2021-10-18 RX ORDER — FOLIC ACID 0.8 MG
1 TABLET ORAL DAILY
Refills: 0 | Status: DISCONTINUED | OUTPATIENT
Start: 2021-10-18 | End: 2021-10-19

## 2021-10-18 RX ORDER — INSULIN LISPRO 100/ML
VIAL (ML) SUBCUTANEOUS AT BEDTIME
Refills: 0 | Status: DISCONTINUED | OUTPATIENT
Start: 2021-10-18 | End: 2021-10-19

## 2021-10-18 RX ORDER — HUMAN INSULIN 100 [IU]/ML
8 INJECTION, SUSPENSION SUBCUTANEOUS
Refills: 0 | Status: DISCONTINUED | OUTPATIENT
Start: 2021-10-18 | End: 2021-10-18

## 2021-10-18 RX ORDER — FENOFIBRATE,MICRONIZED 130 MG
145 CAPSULE ORAL DAILY
Refills: 0 | Status: DISCONTINUED | OUTPATIENT
Start: 2021-10-18 | End: 2021-10-19

## 2021-10-18 RX ORDER — THIAMINE MONONITRATE (VIT B1) 100 MG
100 TABLET ORAL DAILY
Refills: 0 | Status: DISCONTINUED | OUTPATIENT
Start: 2021-10-18 | End: 2021-10-18

## 2021-10-18 RX ADMIN — MORPHINE SULFATE 2 MILLIGRAM(S): 50 CAPSULE, EXTENDED RELEASE ORAL at 06:40

## 2021-10-18 RX ADMIN — Medication 2: at 23:23

## 2021-10-18 RX ADMIN — SODIUM CHLORIDE 1000 MILLILITER(S): 9 INJECTION INTRAMUSCULAR; INTRAVENOUS; SUBCUTANEOUS at 00:31

## 2021-10-18 RX ADMIN — Medication 1 GRAM(S): at 04:04

## 2021-10-18 RX ADMIN — Medication 50 GRAM(S): at 11:35

## 2021-10-18 RX ADMIN — MORPHINE SULFATE 2 MILLIGRAM(S): 50 CAPSULE, EXTENDED RELEASE ORAL at 06:26

## 2021-10-18 RX ADMIN — MORPHINE SULFATE 4 MILLIGRAM(S): 50 CAPSULE, EXTENDED RELEASE ORAL at 00:30

## 2021-10-18 RX ADMIN — HUMAN INSULIN 8 UNIT(S): 100 INJECTION, SUSPENSION SUBCUTANEOUS at 08:49

## 2021-10-18 RX ADMIN — Medication 145 MILLIGRAM(S): at 12:20

## 2021-10-18 RX ADMIN — Medication 1 TABLET(S): at 12:19

## 2021-10-18 RX ADMIN — Medication 1 MILLIGRAM(S): at 12:19

## 2021-10-18 RX ADMIN — Medication 105 MILLIGRAM(S): at 23:13

## 2021-10-18 RX ADMIN — SODIUM CHLORIDE 120 MILLILITER(S): 9 INJECTION, SOLUTION INTRAVENOUS at 06:25

## 2021-10-18 RX ADMIN — Medication 2 MILLIGRAM(S): at 08:33

## 2021-10-18 RX ADMIN — Medication 5 MILLIGRAM(S): at 04:05

## 2021-10-18 RX ADMIN — ONDANSETRON 4 MILLIGRAM(S): 8 TABLET, FILM COATED ORAL at 03:20

## 2021-10-18 RX ADMIN — Medication 4: at 08:48

## 2021-10-18 RX ADMIN — Medication 105 MILLIGRAM(S): at 12:20

## 2021-10-18 RX ADMIN — PANTOPRAZOLE SODIUM 40 MILLIGRAM(S): 20 TABLET, DELAYED RELEASE ORAL at 16:58

## 2021-10-18 RX ADMIN — Medication 2: at 11:35

## 2021-10-18 RX ADMIN — SODIUM CHLORIDE 1000 MILLILITER(S): 9 INJECTION INTRAMUSCULAR; INTRAVENOUS; SUBCUTANEOUS at 01:30

## 2021-10-18 RX ADMIN — INSULIN GLARGINE 15 UNIT(S): 100 INJECTION, SOLUTION SUBCUTANEOUS at 23:10

## 2021-10-18 RX ADMIN — MORPHINE SULFATE 4 MILLIGRAM(S): 50 CAPSULE, EXTENDED RELEASE ORAL at 00:45

## 2021-10-18 NOTE — PROGRESS NOTE ADULT - SUBJECTIVE AND OBJECTIVE BOX
[facility] Division of Hospital Medicine    Chief Complaint:    Abdominal pain    SUBJECTIVE / OVERNIGHT EVENTS:  Still with sharp epigastric abdominal pain since Saturday. No relation to meals. No melena, N V.  Patient denies chest pain, SOB, N/V, fever, chills, dysuria or any other complaints. All remainder ROS negative.     MEDICATIONS  (STANDING):  dextrose 40% Gel 15 Gram(s) Oral once  dextrose 5%. 1000 milliLiter(s) (50 mL/Hr) IV Continuous <Continuous>  dextrose 5%. 1000 milliLiter(s) (100 mL/Hr) IV Continuous <Continuous>  dextrose 50% Injectable 25 Gram(s) IV Push once  dextrose 50% Injectable 12.5 Gram(s) IV Push once  dextrose 50% Injectable 25 Gram(s) IV Push once  fenofibrate Tablet 145 milliGRAM(s) Oral daily  folic acid 1 milliGRAM(s) Oral daily  glucagon  Injectable 1 milliGRAM(s) IntraMuscular once  insulin lispro (ADMELOG) corrective regimen sliding scale   SubCutaneous three times a day before meals  insulin lispro (ADMELOG) corrective regimen sliding scale   SubCutaneous at bedtime  insulin NPH human recombinant 8 Unit(s) SubCutaneous before breakfast  insulin NPH human recombinant 8 Unit(s) SubCutaneous at bedtime  magnesium sulfate  IVPB 2 Gram(s) IV Intermittent once  multivitamin 1 Tablet(s) Oral daily  sodium chloride 0.45%. 1000 milliLiter(s) (120 mL/Hr) IV Continuous <Continuous>  thiamine IVPB 500 milliGRAM(s) IV Intermittent every 8 hours    MEDICATIONS  (PRN):  LORazepam     Tablet 2 milliGRAM(s) Oral every 2 hours PRN Symptom-triggered 2 point increase in CIWA-Ar  morphine  - Injectable 2 milliGRAM(s) IV Push every 6 hours PRN Severe Pain (7 - 10)        I&O's Summary      PHYSICAL EXAM:  Vital Signs Last 24 Hrs  T(C): 36.7 (18 Oct 2021 08:50), Max: 36.7 (18 Oct 2021 00:18)  T(F): 98 (18 Oct 2021 08:50), Max: 98.1 (18 Oct 2021 00:18)  HR: 96 (18 Oct 2021 08:50) (95 - 116)  BP: 116/67 (18 Oct 2021 08:50) (111/72 - 134/83)  BP(mean): --  RR: 18 (18 Oct 2021 08:50) (18 - 18)  SpO2: 99% (18 Oct 2021 08:50) (97% - 99%)        CONSTITUTIONAL: NAD, well-developed  ENMT: Moist oral mucosa, no pharyngeal injection or exudates; normal dentition  RESPIRATORY: Normal respiratory effort; lungs are clear to auscultation bilaterally  CARDIOVASCULAR: Regular rate and rhythm, normal S1 and S2, no murmur/rub/gallop; Peripheral pulses are 2+ bilaterally  ABDOMEN: Moderate  epigastric tenderness on palpation, normoactive bowel sounds, no rebound/guarding;   MUSCLOSKELETAL:  No clubbing or cyanosis of digits; no joint swelling or tenderness to palpation  Subtle tremor of UE b/l  PSYCH: A+O to person, place, and time; affect appropriate  NEUROLOGY: CN 2-12 are intact and symmetric; no gross sensory deficits;   SKIN: No rashes; no palpable lesions    LABS:                        17.4   5.78  )-----------( 238      ( 17 Oct 2021 22:20 )             49.4     10-18    140  |  102  |  9.2  ----------------------------<  247<H>  4.1   |  18.0<L>  |  0.47<L>    Ca    8.5<L>      18 Oct 2021 03:55    TPro  9.0<H>  /  Alb  5.0  /  TBili  0.2<L>  /  DBili  x   /  AST  18  /  ALT  22  /  AlkPhos  121<H>  10-17          Urinalysis Basic - ( 17 Oct 2021 23:16 )    Color: Yellow / Appearance: Slightly Turbid / S.020 / pH: x  Gluc: x / Ketone: Trace  / Bili: Negative / Urobili: Negative mg/dL   Blood: x / Protein: 30 mg/dL / Nitrite: Negative   Leuk Esterase: Negative / RBC: 0-2 /HPF / WBC Negative   Sq Epi: x / Non Sq Epi: Occasional / Bacteria: x        CAPILLARY BLOOD GLUCOSE      POCT Blood Glucose.: 218 mg/dL (18 Oct 2021 08:32)        RADIOLOGY & ADDITIONAL TESTS:  Results Reviewed:   Imaging Personally Reviewed:  Electrocardiogram Personally Reviewed:

## 2021-10-18 NOTE — ED ADULT NURSE REASSESSMENT NOTE - NS ED NURSE REASSESS COMMENT FT1
Pt received @ 0730, A&OX3, amb ad nakia, VSS.  Pt c/o abd pain, no nausea.  Pt was recently medicated with pain meds.  Cm in place, ST.  CIWA = 6.  Abd soft nondistended, nontender, moving all ext well.  Will continue to monitor. Pt received @ 0730, A&OX3, amb ad nakia, VSS.  Pt c/o abd pain, no nausea.  Pt was recently medicated with pain meds.  Cm in place, ST.  CIWA = 6.  Abd soft nondistended, nontender, moving all ext well.  Will continue to monitor.  Dr. Early called and made aware pt was still c/o pain.  He will come to eval pt.

## 2021-10-18 NOTE — PROGRESS NOTE ADULT - ASSESSMENT
27 y/o male with PMH of DM-2 (non-compliant with medication), hypertriglyceridemia, alcohol abuse prior history of pancreatitis came to the ED complaining of abdominal pain x 3 days. Found ot have signs of alcohol withdrawal, CT and labs without evidence of pancreatitis admitted for uncontrollable abdominal pain likely secondary to gastritis as well as etoh withdrawal.       #Alcoholic Gastritis - Epigastric abdominal pain  CT abdomen: no acute finding , Lipase WNL   Reports drinking to excess this weekend and increased alcohol consumption to help alleviate pain  Pain control   PPI  Diet as tolerated     #Hyperglycemia due to underlying DM-2 and diet/insulin non compliance  Prior HbA1C was 11.8. Now 13.6   Previously  discharged on Insulin Novolin 70/30 8 units bid after diagnosis 2 years ago  Start Lantus 15U at bedtime  Insulin sliding scale   Trend glucose    #Alcohol abuse complicated by dependence and acute withdrawal   Was scored CIWA of 10 in the ED  s/p Valium   Symptomatic CIWA protocol   Magnesium/Thiamine load   MVT   Folic acid     #Hypertriglyceridemia   T  Was discharged on Fenofibrate but non-compliant   Cont Fenofibrate 145mg     Supportive   DVT prophylaxis: SCD  Diet: Regular    Dispo: Remains Acute Anticipate DC 1-2 days.

## 2021-10-18 NOTE — H&P ADULT - ASSESSMENT
27 y/o male with PMH of DM-2 (non-compliant with medication), hypertriglyceridemia, alcohol abuse came to the ED complaining of abdominal pain x 3 days. Pain located in the epigastric area similar to when he had pancreatitis, non-radiating, squeezing in nature, associated with non-bloody emesis. Patient has no fever, chills, recent travel, sick contact, chest pain, shortness of breath, palpitation. Of note, patient not taking any medication at home as prescribed, still drinking at least 6 cans of beer 4-5 times/week.    Epigastric abdominal pain  Admit to medical floor   CT abdomen: no acute finding   Lipase WNL   Likely due to alcoholic gastritis   Pain control     Hyperglycemia due to underlying DM-2   During his last admission almost 2 years ago, his HbA1C was 11.8  He was discharged on Insulin Novolin 70/30 8 units bid at the time, but patient said he's not on any medication  Continue gently hydration   Will check HbA1C   Insulin sliding scale     Alcohol intoxication/abuse   Was scored CIWA of 10 in the ED  Valium and IVF given   Will continue gently hydration   On my evaluation, CIWA 1 (mild HA)   Symptomatic CIWA protocol   MVT   Folic acid   Thiamine for possible thiamine deficiency in the setting of alcohol abuse     Hypertriglyceridemia   T  Was discharged on Fenofibrate but non-compliant   Will restart Fenofibrate 145mg     Supportive   DVT prophylaxis: ambulate as tolerated   Diet: CHO     Plan of care discussed with patient and ER nurse

## 2021-10-18 NOTE — ED ADULT NURSE REASSESSMENT NOTE - NS ED NURSE REASSESS COMMENT FT1
Pt alert and oriented x 4, Abd pain slightly improved. No vomiting after given zofran. ST on monitor Safety precautions in place. Will continue to monitor

## 2021-10-18 NOTE — H&P ADULT - HISTORY OF PRESENT ILLNESS
27 y/o male with PMH of DM-2 (non-compliant with medication), hypertriglyceridemia, alcohol abuse came to the ED complaining of abdominal pain x 3 days. Pain located in the epigastric area similar to when he had pancreatitis, non-radiating, squeezing in nature, associated with non-bloody emesis. Patient has no fever, chills, recent travel, sick contact, chest pain, shortness of breath, palpitation. Of note, patient not taking any medication at home as prescribed, still drinking at least 6 cans of beer 4-5 times/week.

## 2021-10-18 NOTE — ED ADULT NURSE NOTE - OBJECTIVE STATEMENT
Pt alert and oriented x 4, c/o of sharp abd pain x couple of days, pt reports in the past have pancreatitis. Elevated lactated IVF infusing.

## 2021-10-19 VITALS
RESPIRATION RATE: 16 BRPM | TEMPERATURE: 98 F | DIASTOLIC BLOOD PRESSURE: 85 MMHG | OXYGEN SATURATION: 98 % | HEART RATE: 78 BPM | SYSTOLIC BLOOD PRESSURE: 115 MMHG

## 2021-10-19 LAB
ANION GAP SERPL CALC-SCNC: 12 MMOL/L — SIGNIFICANT CHANGE UP (ref 5–17)
BASOPHILS # BLD AUTO: 0.02 K/UL — SIGNIFICANT CHANGE UP (ref 0–0.2)
BASOPHILS NFR BLD AUTO: 0.4 % — SIGNIFICANT CHANGE UP (ref 0–2)
BUN SERPL-MCNC: 11.1 MG/DL — SIGNIFICANT CHANGE UP (ref 8–20)
CALCIUM SERPL-MCNC: 8.4 MG/DL — LOW (ref 8.6–10.2)
CHLORIDE SERPL-SCNC: 101 MMOL/L — SIGNIFICANT CHANGE UP (ref 98–107)
CO2 SERPL-SCNC: 25 MMOL/L — SIGNIFICANT CHANGE UP (ref 22–29)
COVID-19 SPIKE DOMAIN AB INTERP: POSITIVE
COVID-19 SPIKE DOMAIN ANTIBODY RESULT: >250 U/ML — HIGH
CREAT SERPL-MCNC: 0.58 MG/DL — SIGNIFICANT CHANGE UP (ref 0.5–1.3)
EOSINOPHIL # BLD AUTO: 0.04 K/UL — SIGNIFICANT CHANGE UP (ref 0–0.5)
EOSINOPHIL NFR BLD AUTO: 0.7 % — SIGNIFICANT CHANGE UP (ref 0–6)
GLUCOSE BLDC GLUCOMTR-MCNC: 171 MG/DL — HIGH (ref 70–99)
GLUCOSE BLDC GLUCOMTR-MCNC: 215 MG/DL — HIGH (ref 70–99)
GLUCOSE SERPL-MCNC: 226 MG/DL — HIGH (ref 70–99)
HCT VFR BLD CALC: 41.9 % — SIGNIFICANT CHANGE UP (ref 39–50)
HGB BLD-MCNC: 14.6 G/DL — SIGNIFICANT CHANGE UP (ref 13–17)
IMM GRANULOCYTES NFR BLD AUTO: 0.2 % — SIGNIFICANT CHANGE UP (ref 0–1.5)
LYMPHOCYTES # BLD AUTO: 1.86 K/UL — SIGNIFICANT CHANGE UP (ref 1–3.3)
LYMPHOCYTES # BLD AUTO: 33.6 % — SIGNIFICANT CHANGE UP (ref 13–44)
MAGNESIUM SERPL-MCNC: 2.1 MG/DL — SIGNIFICANT CHANGE UP (ref 1.6–2.6)
MCHC RBC-ENTMCNC: 32.3 PG — SIGNIFICANT CHANGE UP (ref 27–34)
MCHC RBC-ENTMCNC: 34.8 GM/DL — SIGNIFICANT CHANGE UP (ref 32–36)
MCV RBC AUTO: 92.7 FL — SIGNIFICANT CHANGE UP (ref 80–100)
MONOCYTES # BLD AUTO: 0.4 K/UL — SIGNIFICANT CHANGE UP (ref 0–0.9)
MONOCYTES NFR BLD AUTO: 7.2 % — SIGNIFICANT CHANGE UP (ref 2–14)
NEUTROPHILS # BLD AUTO: 3.2 K/UL — SIGNIFICANT CHANGE UP (ref 1.8–7.4)
NEUTROPHILS NFR BLD AUTO: 57.9 % — SIGNIFICANT CHANGE UP (ref 43–77)
PHOSPHATE SERPL-MCNC: 3.3 MG/DL — SIGNIFICANT CHANGE UP (ref 2.4–4.7)
PLATELET # BLD AUTO: 182 K/UL — SIGNIFICANT CHANGE UP (ref 150–400)
POTASSIUM SERPL-MCNC: 4.1 MMOL/L — SIGNIFICANT CHANGE UP (ref 3.5–5.3)
POTASSIUM SERPL-SCNC: 4.1 MMOL/L — SIGNIFICANT CHANGE UP (ref 3.5–5.3)
RBC # BLD: 4.52 M/UL — SIGNIFICANT CHANGE UP (ref 4.2–5.8)
RBC # FLD: 12.1 % — SIGNIFICANT CHANGE UP (ref 10.3–14.5)
SARS-COV-2 IGG+IGM SERPL QL IA: >250 U/ML — HIGH
SARS-COV-2 IGG+IGM SERPL QL IA: POSITIVE
SODIUM SERPL-SCNC: 138 MMOL/L — SIGNIFICANT CHANGE UP (ref 135–145)
WBC # BLD: 5.53 K/UL — SIGNIFICANT CHANGE UP (ref 3.8–10.5)
WBC # FLD AUTO: 5.53 K/UL — SIGNIFICANT CHANGE UP (ref 3.8–10.5)

## 2021-10-19 PROCEDURE — 84132 ASSAY OF SERUM POTASSIUM: CPT

## 2021-10-19 PROCEDURE — 82803 BLOOD GASES ANY COMBINATION: CPT

## 2021-10-19 PROCEDURE — 82435 ASSAY OF BLOOD CHLORIDE: CPT

## 2021-10-19 PROCEDURE — 84295 ASSAY OF SERUM SODIUM: CPT

## 2021-10-19 PROCEDURE — 83690 ASSAY OF LIPASE: CPT

## 2021-10-19 PROCEDURE — 85014 HEMATOCRIT: CPT

## 2021-10-19 PROCEDURE — 96376 TX/PRO/DX INJ SAME DRUG ADON: CPT

## 2021-10-19 PROCEDURE — 83735 ASSAY OF MAGNESIUM: CPT

## 2021-10-19 PROCEDURE — 84100 ASSAY OF PHOSPHORUS: CPT

## 2021-10-19 PROCEDURE — 36415 COLL VENOUS BLD VENIPUNCTURE: CPT

## 2021-10-19 PROCEDURE — 83036 HEMOGLOBIN GLYCOSYLATED A1C: CPT

## 2021-10-19 PROCEDURE — 96375 TX/PRO/DX INJ NEW DRUG ADDON: CPT

## 2021-10-19 PROCEDURE — 99285 EMERGENCY DEPT VISIT HI MDM: CPT | Mod: 25

## 2021-10-19 PROCEDURE — G1004: CPT

## 2021-10-19 PROCEDURE — 96374 THER/PROPH/DIAG INJ IV PUSH: CPT

## 2021-10-19 PROCEDURE — 80061 LIPID PANEL: CPT

## 2021-10-19 PROCEDURE — 0225U NFCT DS DNA&RNA 21 SARSCOV2: CPT

## 2021-10-19 PROCEDURE — 85018 HEMOGLOBIN: CPT

## 2021-10-19 PROCEDURE — 80307 DRUG TEST PRSMV CHEM ANLYZR: CPT

## 2021-10-19 PROCEDURE — 81001 URINALYSIS AUTO W/SCOPE: CPT

## 2021-10-19 PROCEDURE — 87086 URINE CULTURE/COLONY COUNT: CPT

## 2021-10-19 PROCEDURE — 83605 ASSAY OF LACTIC ACID: CPT

## 2021-10-19 PROCEDURE — 82962 GLUCOSE BLOOD TEST: CPT

## 2021-10-19 PROCEDURE — 82947 ASSAY GLUCOSE BLOOD QUANT: CPT

## 2021-10-19 PROCEDURE — 80048 BASIC METABOLIC PNL TOTAL CA: CPT

## 2021-10-19 PROCEDURE — 80053 COMPREHEN METABOLIC PANEL: CPT

## 2021-10-19 PROCEDURE — 99239 HOSP IP/OBS DSCHRG MGMT >30: CPT

## 2021-10-19 PROCEDURE — 82009 KETONE BODYS QUAL: CPT

## 2021-10-19 PROCEDURE — 74177 CT ABD & PELVIS W/CONTRAST: CPT | Mod: MG

## 2021-10-19 PROCEDURE — 82330 ASSAY OF CALCIUM: CPT

## 2021-10-19 PROCEDURE — 86769 SARS-COV-2 COVID-19 ANTIBODY: CPT

## 2021-10-19 PROCEDURE — 85025 COMPLETE CBC W/AUTO DIFF WBC: CPT

## 2021-10-19 RX ORDER — FOLIC ACID 0.8 MG
1 TABLET ORAL
Qty: 30 | Refills: 0
Start: 2021-10-19 | End: 2021-11-17

## 2021-10-19 RX ORDER — PANTOPRAZOLE SODIUM 20 MG/1
1 TABLET, DELAYED RELEASE ORAL
Qty: 30 | Refills: 0
Start: 2021-10-19 | End: 2021-11-17

## 2021-10-19 RX ORDER — INSULIN NPH HUM/REG INSULIN HM 70-30/ML
8 VIAL (ML) SUBCUTANEOUS
Qty: 60 | Refills: 0
Start: 2021-10-19 | End: 2021-11-17

## 2021-10-19 RX ORDER — FENOFIBRATE,MICRONIZED 130 MG
1 CAPSULE ORAL
Qty: 30 | Refills: 0
Start: 2021-10-19 | End: 2021-11-17

## 2021-10-19 RX ADMIN — Medication 105 MILLIGRAM(S): at 04:58

## 2021-10-19 RX ADMIN — Medication 2: at 13:45

## 2021-10-19 RX ADMIN — Medication 105 MILLIGRAM(S): at 13:45

## 2021-10-19 RX ADMIN — PANTOPRAZOLE SODIUM 40 MILLIGRAM(S): 20 TABLET, DELAYED RELEASE ORAL at 04:58

## 2021-10-19 RX ADMIN — Medication 4: at 08:30

## 2021-10-19 RX ADMIN — Medication 1 MILLIGRAM(S): at 11:13

## 2021-10-19 RX ADMIN — Medication 1 TABLET(S): at 11:13

## 2021-10-19 RX ADMIN — Medication 145 MILLIGRAM(S): at 11:13

## 2021-10-19 NOTE — DISCHARGE NOTE PROVIDER - NSDCMRMEDTOKEN_GEN_ALL_CORE_FT
DME: Glucometer   DME: Test strips   DME: LANCETS  DME: Pen needles   fenofibrate 145 mg oral tablet: 1 tab(s) orally once a day  folic acid 1 mg oral tablet: 1 tab(s) orally once a day  Multiple Vitamins oral tablet: 1 tab(s) orally once a day  NovoLIN 70/30 FlexPen subcutaneous suspension: 8 unit(s) subcutaneous 2 times a day   thiamine 100 mg oral tablet: 1 tab(s) orally once a day   DME: 1 each intradermal 3 times a day   DME: Lancet 3 times a day   DME: 1 each subcutaneous 2 times a day   fenofibrate 145 mg oral tablet: 1 tab(s) orally once a day  folic acid 1 mg oral tablet: 1 tab(s) orally once a day  Multiple Vitamins oral tablet: 1 tab(s) orally once a day  NovoLIN 70/30 FlexPen subcutaneous suspension: 8 unit(s) subcutaneous 2 times a day   pantoprazole 40 mg oral delayed release tablet: 1 tab(s) orally once a day   thiamine 100 mg oral tablet: 1 tab(s) orally once a day

## 2021-10-19 NOTE — DISCHARGE NOTE PROVIDER - HOSPITAL COURSE
27 y/o male with PMH of DM-2 (non-compliant with medication), hypertriglyceridemia, alcohol abuse prior history of pancreatitis came to the ED complaining of abdominal pain x 3 days. Found to have signs of alcohol withdrawal, CT abdomen and labs without evidence of pancreatitis admitted for uncontrollable abdominal pain likely secondary to gastritis, etoh withdrawal, and uncontrolled DM.   Abdominal pain improved with PPI. Did not have evidence of melena, hematochezia, hematemesis. Tolerating solid diet. Initially required benzos for evidence of etoh withdrawal. Received valium in the ED. Has not needed prn ativan over last 24h. Received IV fluids, mag, thiamine load. Self tapering well on valium.   Furthermore, found to have worsening DM. Diagnosed on prior admission with a1c 11.8 now 13.6. Did not take prescribed insulin from prior admission.  Extensive amount of time was taken to speak with patient regarding the effects of long-term alcohol abuse on his health as well as education on diabetes and risk of adverse complications in the future. The patient reflected understanding and agreed that he needed to decrease his alcohol consumption as well as start taking his Insulin as prescribed. Has cousins that he can use for social support and recommended to speak with them whenever he gets urges to drink. Total time spent discussing with the patient: 33 minutes (10:03 am to 10:36 am)   Patient to be discharged on insulin regiment as well as PPI, MV, and fenofibrate. Will provide referrals for endocrine.       VITALS:   T(C): 36.8 (10-19-21 @ 09:31), Max: 36.8 (10-19-21 @ 09:31)  HR: 87 (10-19-21 @ 09:31) (77 - 87)  BP: 122/87 (10-19-21 @ 09:31) (103/69 - 122/87)  RR: 18 (10-19-21 @ 09:31) (18 - 18)  SpO2: 97% (10-19-21 @ 09:31) (97% - 99%)    GENERAL: NAD, lying in bed comfortably  HEAD:  Atraumatic, Normocephalic  EYES: EOMI, PERRLA, conjunctiva and sclera clear  ENT: Moist mucous membranes  NECK: Supple, No JVD  CHEST/LUNG: Clear to auscultation bilaterally; No rales, rhonchi, wheezing, or rubs. Unlabored respirations  HEART: Regular rate and rhythm; No murmurs, rubs, or gallops  ABDOMEN: BSx4; Soft, nontender, nondistended  EXTREMITIES:  2+ Peripheral Pulses, brisk capillary refill. No clubbing, cyanosis, or edema  NERVOUS SYSTEM:  A&Ox3, no focal deficits   SKIN: No rashes or lesions  PSYCH: Normal affect, euthymic mood      Discharge time: 56 minutes

## 2021-10-19 NOTE — DISCHARGE NOTE PROVIDER - NSDCCPCAREPLAN_GEN_ALL_CORE_FT
PRINCIPAL DISCHARGE DIAGNOSIS  Diagnosis: Alcohol withdrawal  Assessment and Plan of Treatment: Please abstain from alcohol as dicussed.      SECONDARY DISCHARGE DIAGNOSES  Diagnosis: Abdominal pain  Assessment and Plan of Treatment:     Diagnosis: Nausea and vomiting  Assessment and Plan of Treatment:     Diagnosis: Alcoholic gastritis  Assessment and Plan of Treatment:     Diagnosis: Severe uncontrolled diabetes mellitus  Assessment and Plan of Treatment: Please take insulin as prescribed. Follow up with endocrinologist. Information prvided for an appointment.

## 2021-10-19 NOTE — DISCHARGE NOTE NURSING/CASE MANAGEMENT/SOCIAL WORK - PATIENT PORTAL LINK FT
You can access the FollowMyHealth Patient Portal offered by [facility] by registering at the following website: [email or web address]. By joining [employer]’s FollowMyHealth portal, you will also be able to view your health information using other applications (apps) compatible with our system.

## 2021-10-19 NOTE — DISCHARGE NOTE PROVIDER - CARE PROVIDER_API CALL
Chivo Huber  ENDOCRINOLOGY/METAB/DIABETES  [address] [place] [address]  Phone: [phone]  Fax: [phone]  Follow Up Time: 1 week

## 2021-10-20 LAB
CULTURE RESULTS: SIGNIFICANT CHANGE UP
SPECIMEN SOURCE: SIGNIFICANT CHANGE UP

## 2024-09-03 ENCOUNTER — EMERGENCY (EMERGENCY)
Facility: HOSPITAL | Age: 29
LOS: 1 days | Discharge: HOME | End: 2024-09-03
Attending: STUDENT IN AN ORGANIZED HEALTH CARE EDUCATION/TRAINING PROGRAM
Payer: SELF-PAY

## 2024-09-03 VITALS
DIASTOLIC BLOOD PRESSURE: 77 MMHG | RESPIRATION RATE: 18 BRPM | OXYGEN SATURATION: 98 % | TEMPERATURE: 98 F | HEART RATE: 109 BPM | SYSTOLIC BLOOD PRESSURE: 120 MMHG

## 2024-09-03 VITALS
SYSTOLIC BLOOD PRESSURE: 114 MMHG | RESPIRATION RATE: 18 BRPM | HEART RATE: 85 BPM | TEMPERATURE: 98 F | OXYGEN SATURATION: 98 % | DIASTOLIC BLOOD PRESSURE: 68 MMHG

## 2024-09-03 PROCEDURE — 99283 EMERGENCY DEPT VISIT LOW MDM: CPT

## 2024-09-03 RX ORDER — MAGNESIUM, ALUMINUM HYDROXIDE 200-225/5
30 SUSPENSION, ORAL (FINAL DOSE FORM) ORAL ONCE
Refills: 0 | Status: COMPLETED | OUTPATIENT
Start: 2024-09-03 | End: 2024-09-03

## 2024-09-03 NOTE — ED PROVIDER NOTE - NSFOLLOWUPINSTRUCTIONS_ED_ALL_ED_FT
Abuso de alcohol    LO QUE NECESITA SABER:    ¿Qué es el abuso de alcohol?El abuso de alcohol significa que usted chevy más de los límites diarios o semanales recomendados. Usted puede estar bebiendo alcohol regularmente o bebiendo grandes cantidades en un corto período (atracones de bebida). Usted sigue tomando, aunque esto le cause problemas legales, laborales o con edith relaciones.    ¿Qué necesito saber acerca de los límites recomendados de alcohol?Irene bebida se define sue 12 onzas (oz) de cerveza, 5 onzas de vino o 1.5 onzas de licor, sue el whisky.    Los hombres de 21 a 64 añosdeberían limitar el consumo de alcohol a 2 tragos al día. No tome más de 4 bebidas por día o más de 14 en irene semana.    Todos los hombres y las mujeres de 65 años o másdeberían limitar el consumo de alcohol a 1 trago por día. No tome más de 3 bebidas por día o más de 7 en irene semana. No consuma bebidas alcohólicas si está embarazada.  ¿Cuáles son los signos y síntomas del abuso de alcohol?    Pérdida de interés en edith actividades, trabajo y labor escolar    Esconder alcohol o beber en privado    Depresión o sentimiento de culpa por beber    Pensamientos constantes acerca del alcohol    Beber por la mañana para aliviar los efectos de la resaca    No poder controlar la cantidad que se gume    Inquietud, comportamiento errático y violento  ¿Qué problemas de jax puede causar el abuso del alcohol?    Cáncer en hígado, páncreas, estómago, colon, riñón o mamas    Derrame cerebral o ataque cardíaco    Enfermedad hepática, renal o pulmonar    Desmayos, pérdida de memoria, daño cerebral o demencia    Diabetes, problemas del sistema inmunológico o deficiencia de tiamina (vitamina B1)    Problemas para usted y bingham bebé si chevy pankaj el embarazo  ¿Cómo se trata el abuso del alcohol?El tratamiento lo va a ayudar a comprender la razón por la cual usted abusa del alcohol. Los consejeros y terapeutas le van a proveer apoyo y lo van a ayudar a encontrar formas de afrontamiento en vez de zeus. Usted podría necesitar tratamiento con internación para proveerle un ambiente controlado. Usted podría necesitar tratamiento ambulatorio después de que bingham tratamiento hospitalario haya sido completado.    La desintoxicaciónes un programa utilizado para eliminar el alcohol de bingham cuerpo. Pankaj la desintoxicación se administran medicamentos para ayudar a evitar los síntomas de abstinencia que se presentan cuando se suspende el consumo de alcohol.    La terapia de intervención brevelo ayuda a pensar de manera diferente sobre bingham consumo de alcohol. Un médico lo ayuda a fijar metas para disminuir bingham consumo de bebidas alcohólicas. La terapia puede continuar después de que sale del hospital.    Suplementos vitamínicos, sue B1, pueden ser necesarios. El consumo de alcohol puede dificultar la capacidad del organismo de absorber suficiente vitamina B1. Es posible que le den vitamina B1 si bingham nivel es bajo. También se administra para prevenir el daño cerebral por consumo de alcohol.  ¿Qué puedo hacer para manejar mi abuso de alcohol?    Trabaje con los médicos en los objetivos para beber menos.[place] puede ayudar a prevenir problemas de jax. Por ejemplo, puede empezar por planificar bingham consumo semanal de alcohol. Será más fácil zeus menos bebidas si planifica con antelación.    Cuando yadiel alcohol, acompáñelo con comida.La comida evitará que el alcohol entre en bingham sistema demasiado rápido. Coma antes de zeus bingham primera bebida alcohólica.    Calcule con cuidado el tiempo de edith bebidas.No tome más de irene bebida por hora. [place] líquido, sue agua, café o un refresco, entre las bebidas alcohólicas.    No maneje si ha tomado alcohol.Planifique con antelación para tener un viaje seguro a casa. Asegúrese de que alguien que no haya estado bebiendo lo pueda llevar a casa. Planifique el uso de un taxi u otro servicio de transporte, si lo necesita.    No yadiel alcohol si está tomando santo medicamento.El alcohol es peligroso cuando se combina con ciertos medicamentos, sue acetaminofeno o un medicamento para la presión arterial. Hable con bingham médico acerca de todos los medicamentos que está tomando.  ¿Dónde puedo obtener apoyo y más información?    Alcoholics Anonymous  Web Address: http://www.aa.org  Substance Abuse and Mental Health Services Administration ([facility])  [address]  [place] [address]  Web Address: http://www.samhsa.gov or https://dpt2.samhsa.gov/treatment/  Llame al número local de emergencias (911 en los [place]), o pídale a alguien que llame si:    Tiene dolor en el pecho o dificultad para respirar de forma repentina.    Usted quiere lastimarse o lastimar a otros.    Usted sufre irene convulsión.  ¿Cuándo norma buscar atención inmediatamente?    Usted no puede dejar de vomitar o vomita lorena.    ¿Cuándo norma llamar a mi médico?    Usted tiene alucinaciones (ve o escucha cosas que no son reales).    Usted tiene preguntas o inquietudes acerca de bingham condición o cuidado.  ACUERDOS SOBRE BINGHAM CUIDADO:    Usted tiene el derecho de ayudar a planear bingham cuidado. Aprenda todo lo que pueda sobre bingham condición y sue darle tratamiento. Discuta edith opciones de tratamiento con edith médicos para decidir el cuidado que usted desea recibir. Usted siempre tiene el derecho de rechazar el tratamiento.

## 2024-09-03 NOTE — ED ADULT TRIAGE NOTE - CHIEF COMPLAINT QUOTE
pt BIBA for etoh all day with n/v, per ems pt GCS 3 on arrival responsive to sternal rub, pt now alert reports drinking 15-20 beers past five days. FS in triage 138 changed into yellow gown belongings removed

## 2024-09-03 NOTE — ED PROVIDER NOTE - NS ED ROS FT
Constitutional: no fever  CV: no chest pain  Resp: no cough, no shortness of breath  GI: +abdominal pain, +vomiting, no diarrhea  : no dysuria  MSK: no joint pain  Neuro: no headache

## 2024-09-03 NOTE — ED PROVIDER NOTE - PATIENT PORTAL LINK FT
You can access the FollowMyHealth Patient Portal offered by [facility] by registering at the following website: [email or web address]. By joining [employer]’s FollowMyHealth portal, you will also be able to view your health information using other applications (apps) compatible with our system.

## 2024-09-03 NOTE — ED PROVIDER NOTE - OBJECTIVE STATEMENT
29y M w/ hx DM2, pancreatitis, ETOH abuse, BIBEMS for intoxication. Pt admits to drinking heavily over the past few days. Reports having associated epigastric pain and nausea, now resolved. Last drink earlier today.

## 2024-09-03 NOTE — ED PROVIDER NOTE - CLINICAL SUMMARY MEDICAL DECISION MAKING FREE TEXT BOX
29y M presents for intoxication. Reports having abdominal pain earlier today, now resolved. Pt well appearing with benign exam. Tolerating PO in the ED. Pt now AAOx4, ambulatory with steady gait, clinically sober. Stable for discharge.

## 2024-09-04 PROBLEM — E78.1 PURE HYPERGLYCERIDEMIA: Chronic | Status: ACTIVE | Noted: 2021-10-18

## 2024-09-04 PROBLEM — E11.9 TYPE 2 DIABETES MELLITUS WITHOUT COMPLICATIONS: Chronic | Status: ACTIVE | Noted: 2021-10-18

## 2024-09-04 PROBLEM — F10.10 ALCOHOL ABUSE, UNCOMPLICATED: Chronic | Status: ACTIVE | Noted: 2021-10-18

## 2024-09-04 PROCEDURE — 99285 EMERGENCY DEPT VISIT HI MDM: CPT

## 2024-09-04 PROCEDURE — 82962 GLUCOSE BLOOD TEST: CPT

## 2024-09-04 RX ADMIN — Medication 30 MILLILITER(S): at 00:21

## 2024-09-04 NOTE — ED ADULT NURSE NOTE - OBJECTIVE STATEMENT
pt c/o ETOH ingestion. pt brought in for intox. upon assessment, pt resting comfortably in NAD, resps even and unlabored, a&ox4 requesting to go home. pt ambulatory with steady gait, c/o mild epigastric pain. denies shortness of breath, N/V/D, dizziness.

## 2024-09-05 DIAGNOSIS — F10.129 ALCOHOL ABUSE WITH INTOXICATION, UNSPECIFIED: ICD-10-CM

## 2024-09-05 DIAGNOSIS — E11.9 TYPE 2 DIABETES MELLITUS WITHOUT COMPLICATIONS: ICD-10-CM

## 2024-09-30 NOTE — DISCHARGE NOTE PROVIDER - NSCORESITESY/N_GEN_A_CORE_RD
Call Dr. Taylor's office or call central scheduling at [phone] to schedule    Start jardiance. Drink at least 60oz water daily. Let me know if too costly. There are other similar options like dapaglliflozin and canagliflozin     Healthy lifestyle tips to decrease weight, body pain, blood sugar, blood pressure, and cholesterol while improving sleep, mood, and cognition  Sleep is the most important. Aim for about 7 hours per night. People sleep less now than ever before, mostly due to artificial light and screen usage.   Try to keep a somewhat regular sleeping schedule (go to bed and wake up at similar times every day)  Bedroom should be totally dark. All lights (even minimal) from devices should be covered  Room should be on cooler side, 68 degrees Farenheit  Take a hot shower before bed.   Cut down on screen time and try to avoid any screen time 2 hours before bed. This is most people's biggest problem. Keep your phone in a different room or at least not easily accessible. Put it on \"Sleep Mode\" or \"Do Not Disturb\"  Do not do work or use screens in bed. Use bed only for sleep and sex.  Try not to eat within 2 hours of sleeping  If you are someone who has trouble falling asleep at night, do not get in or stay in bed unless you are tired.   Limit caffeine, especially after 12pm  Try to avoid napping during the day  Avoid alcohol and sleeping medications. These may put you to sleep but your body will not go through the normal sleep cycles. Therefore your sleep will not be as deep or as restorative  Exercise/move your body every day  Exercise is next most important.   Move as much as you can throughout the day. Avoid sitting in chairs for long periods.   Try to sit on the floor when watching TV etc. Put pull-up bars throughout the house. Carry things. Find little ways to increase your daily movement  Any exercise is better than no exercise, even if only 5 minutes per day. This adds up significantly over time,  especially if done every day  Focus on the following four types of exercise:  Long distance cardio exercise: in which your heart is beating fast (ie running, biking, swimming, elliptical, walking briskly, or walking while carrying a backpack). Should go at a pace in which you can talk but it is hard to.   Goal: 40 minutes, 4 times per week  If you don't exercise like this already, take it slow. Start by just 5 minutes, 2 times per week and slowly increase  High intensity cardio exercise: (ie running, biking, swimming, elliptical, walking briskly). Go at max effort that you can sustain for four minutes. Then a light pace for 4 minutes until heart rate is <100. Repeat this 4-6 times for a total of 30 minutes per workout  Goal: 30 minutes, 1 time per week.   Lift weights or perform body-weight exercises  Goal: 2-3 times per week.   If you don't weight-lift already, start with just \"body-weight\" exercises (push-ups, pull-ups, squats, lunges) or use very light weights. Use videos online (search for \"body-weight\" workouts on YouTube) or go to a gym or get a   Listen to your body, if something hurts, take a break  Yoga, Pilates, balance training, or some sort of stretching   Goal: Everyday, throughout the day. Again, sitting on the floor in different positions counts  Go outside as much as possible. Walking is underrated and is a fantastic way to improve your health.  Healthy eating  Keep things simple. There are many fad diets out there. These typically only work in the short term. Start by making small changes that you can continue lifelong.   It is okay to let yourself feel real hunger. Most people generally eat food for comfort, to deal with stress, or because they think they should eat a certain number of times per day. This mentality leads to increased food consumption as compared to just eating when you are truly hungry. This does not mean you should starve yourself (starvation does not work, and may  end up leading to weight gain over the long term). Just listen to your body and try to eat when you are actually hungry.   In general, limit processed and ultra-processed foods:  Avoid added sugar/artificial syrups (anything with more than 6 grams of added sugar is way too much). For example, 12 grams of added sugar is about a whole tablespoon of sugar. \"Added Sugars\" (sometimes just written as \"Sugars\") is listed on every food label under the \"Carbohydrates\" section  Avoid added sodium/salt. Everyone should limit daily sodium intake to less than 2000mg per day, this is about a single teaspoon of table salt. Salt is added to most foods so you always need to look at the food label for sodium content.   Avoid preservatives, food dyes, or chemical names that you do not recognize or cannot easily look up (vitamins etc are generally okay)  Should also avoid \"refined\" or \"enriched\" grains like white rice, white bread, or standard pasta. Should use whole-wheat products like whole grain brown rice (this is not the same thing as fried rice), whole wheat bread, or whole wheat pasta  There is absolutely no benefit to sugary drinks like juice, soda, sports drinks (like gatorade), energy drinks, sweet tea, and sweet coffee drinks. No one should be consuming these on a regular basis. Water, milk, and unsweetened tea/coffee are just fine  Limit alcohol intake. Alcohol has a lot of calories and should not be consumed on a regular basis. There is anywhere from 100-200 calories in a single shot of hard liquor, a single glass of wine, and a single can of beer. And even more calories are present in mixed drinks.  Ideally most of your calories should come from plant foods such as whole vegetables, fruits, whole grains, beans, nuts, and seeds. The more fiber, the better. Fiber is only found in plant foods.   But make sure you are getting plenty of protein: meats, fish, poultry, dairy, beans, nuts, and seeds are in general good sources.  Again, the less processed, the better  Limit exposure to toxic substances like drugs, tobacco smoke, marijuana, vaping, and alcohol  Focus on mental health  Humans are social beings. Find ways to interact with others (ideally in-person) on a daily basis. Cultivating long-term relationships with others is incredibly important.  Identify anything in your life that is causing you unnecessary stress and get help  Think about seeing a therapist if you struggle with anxiety, depression, or poor sleep. Contact your insurance company for in-network providers  Find some quiet-time every day in which there is no phone, music playing, podcasts, TV etc. Just a few minutes of silence per day can make a big difference. Going on a walk, run, or bike ride outside without your phone is a great way to do this.   Set your phone up to limit notifications to only the most important ones like phone calls and text messages from select people. You can turn off notifications from individual apps as well as turn off notifications from group conversations/text messages etc.  Focusing on the above (sleeping, exercising, going outside, eating healthy) will improve your mental health. Exercise is the first-line treatment for anxiety, depression, and many mental health disorders.   Book recommendations:   Atomic Habits by Khang De Souza  Move Your DNA by Maribell Herrera  Comfort Crisis by Satnam Christensen     No

## 2025-02-01 NOTE — ED PROVIDER NOTE - NS_ ATTENDINGSCRIBEDETAILS _ED_A_ED_FT
The scribe's documentation has been prepared under my direction and personally reviewed by me in its entirety. I confirm that the note above accurately reflects all work, treatment, procedures, and medical decision making performed by me.
[facility]